# Patient Record
Sex: FEMALE | Race: WHITE | Employment: OTHER | URBAN - METROPOLITAN AREA
[De-identification: names, ages, dates, MRNs, and addresses within clinical notes are randomized per-mention and may not be internally consistent; named-entity substitution may affect disease eponyms.]

---

## 2017-11-13 ENCOUNTER — HOSPITAL ENCOUNTER (OUTPATIENT)
Age: 72
Setting detail: OUTPATIENT SURGERY
Discharge: HOME | End: 2017-11-13
Attending: SPECIALIST | Admitting: SPECIALIST
Payer: MEDICARE

## 2017-11-13 ENCOUNTER — ANESTHESIA EVENT (OUTPATIENT)
Dept: ENDOSCOPY | Age: 72
End: 2017-11-13
Payer: MEDICARE

## 2017-11-13 ENCOUNTER — ANESTHESIA (OUTPATIENT)
Dept: ENDOSCOPY | Age: 72
End: 2017-11-13
Payer: MEDICARE

## 2017-11-13 VITALS
SYSTOLIC BLOOD PRESSURE: 134 MMHG | RESPIRATION RATE: 18 BRPM | BODY MASS INDEX: 27.86 KG/M2 | OXYGEN SATURATION: 97 % | WEIGHT: 167.19 LBS | TEMPERATURE: 97.8 F | HEART RATE: 67 BPM | HEIGHT: 65 IN | DIASTOLIC BLOOD PRESSURE: 86 MMHG

## 2017-11-13 LAB
H PYLORI FROM TISSUE: NEGATIVE
KIT LOT NO., HCLOLOT: NORMAL
NEGATIVE CONTROL: NEGATIVE
POSITIVE CONTROL: POSITIVE

## 2017-11-13 PROCEDURE — 87077 CULTURE AEROBIC IDENTIFY: CPT | Performed by: SPECIALIST

## 2017-11-13 PROCEDURE — 88305 TISSUE EXAM BY PATHOLOGIST: CPT | Performed by: SPECIALIST

## 2017-11-13 PROCEDURE — 76060000031 HC ANESTHESIA FIRST 0.5 HR: Performed by: SPECIALIST

## 2017-11-13 PROCEDURE — 76040000019: Performed by: SPECIALIST

## 2017-11-13 PROCEDURE — 77030009426 HC FCPS BIOP ENDOSC BSC -B: Performed by: SPECIALIST

## 2017-11-13 PROCEDURE — 74011250636 HC RX REV CODE- 250/636

## 2017-11-13 RX ORDER — FLUMAZENIL 0.1 MG/ML
0.2 INJECTION INTRAVENOUS
Status: DISCONTINUED | OUTPATIENT
Start: 2017-11-13 | End: 2017-11-13 | Stop reason: HOSPADM

## 2017-11-13 RX ORDER — SODIUM CHLORIDE 9 MG/ML
50 INJECTION, SOLUTION INTRAVENOUS CONTINUOUS
Status: DISCONTINUED | OUTPATIENT
Start: 2017-11-13 | End: 2017-11-13 | Stop reason: HOSPADM

## 2017-11-13 RX ORDER — LORAZEPAM 2 MG/ML
2 INJECTION INTRAMUSCULAR AS NEEDED
Status: DISCONTINUED | OUTPATIENT
Start: 2017-11-13 | End: 2017-11-13 | Stop reason: HOSPADM

## 2017-11-13 RX ORDER — PROPOFOL 10 MG/ML
INJECTION, EMULSION INTRAVENOUS AS NEEDED
Status: DISCONTINUED | OUTPATIENT
Start: 2017-11-13 | End: 2017-11-13 | Stop reason: HOSPADM

## 2017-11-13 RX ORDER — RANITIDINE 150 MG/1
150 TABLET, FILM COATED ORAL DAILY
COMMUNITY
End: 2020-02-28 | Stop reason: ALTCHOICE

## 2017-11-13 RX ORDER — GLUCOSAMINE SULFATE 1500 MG
1000 POWDER IN PACKET (EA) ORAL DAILY
COMMUNITY

## 2017-11-13 RX ORDER — ASPIRIN 81 MG/1
81 TABLET ORAL DAILY
Status: ON HOLD | COMMUNITY
End: 2020-01-30

## 2017-11-13 RX ORDER — EPINEPHRINE 0.1 MG/ML
1 INJECTION INTRACARDIAC; INTRAVENOUS
Status: DISCONTINUED | OUTPATIENT
Start: 2017-11-13 | End: 2017-11-13 | Stop reason: HOSPADM

## 2017-11-13 RX ORDER — NALOXONE HYDROCHLORIDE 0.4 MG/ML
0.4 INJECTION, SOLUTION INTRAMUSCULAR; INTRAVENOUS; SUBCUTANEOUS
Status: DISCONTINUED | OUTPATIENT
Start: 2017-11-13 | End: 2017-11-13 | Stop reason: HOSPADM

## 2017-11-13 RX ORDER — SODIUM CHLORIDE 9 MG/ML
INJECTION, SOLUTION INTRAVENOUS
Status: DISCONTINUED | OUTPATIENT
Start: 2017-11-13 | End: 2017-11-13 | Stop reason: HOSPADM

## 2017-11-13 RX ORDER — SODIUM CHLORIDE 0.9 % (FLUSH) 0.9 %
5-10 SYRINGE (ML) INJECTION EVERY 8 HOURS
Status: DISCONTINUED | OUTPATIENT
Start: 2017-11-13 | End: 2017-11-13 | Stop reason: HOSPADM

## 2017-11-13 RX ORDER — DEXTROMETHORPHAN/PSEUDOEPHED 2.5-7.5/.8
1.2 DROPS ORAL
Status: DISCONTINUED | OUTPATIENT
Start: 2017-11-13 | End: 2017-11-13 | Stop reason: HOSPADM

## 2017-11-13 RX ORDER — FENTANYL CITRATE 50 UG/ML
200 INJECTION, SOLUTION INTRAMUSCULAR; INTRAVENOUS
Status: DISCONTINUED | OUTPATIENT
Start: 2017-11-13 | End: 2017-11-13 | Stop reason: HOSPADM

## 2017-11-13 RX ORDER — SODIUM CHLORIDE 0.9 % (FLUSH) 0.9 %
5-10 SYRINGE (ML) INJECTION AS NEEDED
Status: DISCONTINUED | OUTPATIENT
Start: 2017-11-13 | End: 2017-11-13 | Stop reason: HOSPADM

## 2017-11-13 RX ORDER — MIDAZOLAM HYDROCHLORIDE 1 MG/ML
.25-1 INJECTION, SOLUTION INTRAMUSCULAR; INTRAVENOUS
Status: DISCONTINUED | OUTPATIENT
Start: 2017-11-13 | End: 2017-11-13 | Stop reason: HOSPADM

## 2017-11-13 RX ORDER — ATORVASTATIN CALCIUM 20 MG/1
10 TABLET, FILM COATED ORAL DAILY
COMMUNITY
End: 2022-05-26 | Stop reason: SDUPTHER

## 2017-11-13 RX ORDER — ATROPINE SULFATE 0.1 MG/ML
0.5 INJECTION INTRAVENOUS
Status: DISCONTINUED | OUTPATIENT
Start: 2017-11-13 | End: 2017-11-13 | Stop reason: HOSPADM

## 2017-11-13 RX ADMIN — PROPOFOL 20 MG: 10 INJECTION, EMULSION INTRAVENOUS at 10:18

## 2017-11-13 RX ADMIN — PROPOFOL 30 MG: 10 INJECTION, EMULSION INTRAVENOUS at 10:15

## 2017-11-13 RX ADMIN — PROPOFOL 70 MG: 10 INJECTION, EMULSION INTRAVENOUS at 10:14

## 2017-11-13 RX ADMIN — PROPOFOL 20 MG: 10 INJECTION, EMULSION INTRAVENOUS at 10:24

## 2017-11-13 RX ADMIN — PROPOFOL 30 MG: 10 INJECTION, EMULSION INTRAVENOUS at 10:26

## 2017-11-13 RX ADMIN — PROPOFOL 10 MG: 10 INJECTION, EMULSION INTRAVENOUS at 10:16

## 2017-11-13 RX ADMIN — PROPOFOL 20 MG: 10 INJECTION, EMULSION INTRAVENOUS at 10:20

## 2017-11-13 RX ADMIN — PROPOFOL 30 MG: 10 INJECTION, EMULSION INTRAVENOUS at 10:22

## 2017-11-13 RX ADMIN — SODIUM CHLORIDE: 9 INJECTION, SOLUTION INTRAVENOUS at 10:01

## 2017-11-13 RX ADMIN — PROPOFOL 20 MG: 10 INJECTION, EMULSION INTRAVENOUS at 10:17

## 2017-11-13 NOTE — ROUTINE PROCESS
Sita Ewing  [birth year]  [id number]    Situation:  Verbal report received from: Mickey Roman RN  Procedure: Procedure(s):  ESOPHAGOGASTRODUODENOSCOPY (EGD)  ESOPHAGOGASTRODUODENAL (EGD) BIOPSY  ESOPHAGEAL DILATION    Background:    Preoperative diagnosis: DYSPHAGIA, GERD  Postoperative diagnosis: Gastritis  Fundal Gland Polyps  Small Hiatal Hernia  Esophageal Stricture, questionable Shatzki's Ring    :  Dr. Yovani Armenta  Assistant(s): Endoscopy Technician-1: Latha Alvarez  Endoscopy RN-1: Alison Gonzalez RN    Specimens:   ID Type Source Tests Collected by Time Destination   1 : Fundal Gland Polyps Preservative Gastric  Balwinder Kent MD 11/13/2017 1019 Pathology   2 : GE [place]unction Preservative Esophagus, Distal  Balwinder Kent MD 11/13/2017 1022 Pathology     H. Pylori  yes    Assessment:  Intra-procedure medications   Anesthesia gave intra-procedure sedation and medications, see anesthesia flow sheet yes    Intravenous fluids: NS@ K[facility]     Vital signs stable     Abdominal assessment: round and soft     Recommendation:  Discharge patient per MD order.   Family  Permission to share finding with family or friend yes

## 2017-11-13 NOTE — DISCHARGE INSTRUCTIONS
James Thompson  [id number]  1945    EGD DISCHARGE INSTRUCTIONS  Discomfort:  Sore throat- throat lozenges or warm salt water gargle  redness at IV site- apply warm compress to area; if redness or soreness persist- contact your physician  Gaseous discomfort- walking, belching will help relieve any discomfort  You may not operate a vehicle for 12 hours  You may not engage in an occupation involving machinery or appliances for rest of today. You may not drink alcoholic beverages for at least 12 hours  Avoid making any critical decisions for at least 24 hour  DIET  You may resume your regular diet - however -  remember your colon is empty and a heavy meal will produce gas. Avoid these foods:  vegetables, fried / greasy foods, carbonated drinks  ACTIVITY  You may resume your normal daily activities. Spend the remainder of the day resting -  avoid any strenuous activity. CALL M.D. ANY SIGN OF   Increasing pain, nausea, vomiting  Abdominal distension (swelling)  New increased bleeding (oral or rectal)  Fever (chills)  Pain in chest area  Bloody discharge from nose or mouth  Shortness of breath    You may take any Advil, Aspirin, Ibuprofen, Motrin, Aleve, or Goodys only if MD recommended, ONLY  Tylenol as needed for pain.     Follow-up Instructions:   Call Dr. Mary Chen office to make appointment for ongoing symptoms  Office telephone for problems or questions [phone]  Results of procedure / biopsy in 10-14 days   Stomach polyps biopsied ordinarily this is not a concern  Small hiatal hernia and minimal inflammation in the esophagus which was stretched

## 2017-11-13 NOTE — IP AVS SNAPSHOT
[address] 
[phone] Patient: Melany Orta MRN: [id number] About your hospitalization You were admitted on:  November 13, 2017 [facility] You were discharged on:  November 13, 2017 Why you were hospitalized Your primary diagnosis was:  Not on File Discharge Orders None A check lm indicates which time of day the medication should be taken. My Medications TAKE these medications as instructed Instructions Each Dose to Equal  
 Morning Noon Evening Bedtime  
 aspirin delayed-release 81 mg tablet Your last dose was: Your next dose is: Take  by mouth daily. atorvastatin 10 mg tablet Commonly known as:  LIPITOR Your last dose was: Your next dose is: Take  by mouth daily. cholecalciferol 1,000 unit Cap Commonly known as:  VITAMIN D3 Your last dose was: Your next dose is: Take  by mouth daily. ZANTAC 150 mg tablet Generic drug:  raNITIdine Your last dose was: Your next dose is: Take 150 mg by mouth two (2) times a day. 150 mg Discharge Instructions Melany Orta [id number] 1945 EGD DISCHARGE INSTRUCTIONS Discomfort: 
Sore throat- throat lozenges or warm salt water gargle 
redness at IV site- apply warm compress to area; if redness or soreness persist- contact your physician Gaseous discomfort- walking, belching will help relieve any discomfort You may not operate a vehicle for 12 hours You may not engage in an occupation involving machinery or appliances for rest of today. You may not drink alcoholic beverages for at least 12 hours Avoid making any critical decisions for at least 24 hour DIET 
You may resume your regular diet  however -  remember your colon is empty and a heavy meal will produce gas. Avoid these foods:  vegetables, fried / greasy foods, carbonated drinks ACTIVITY You may resume your normal daily activities. Spend the remainder of the day resting -  avoid any strenuous activity. CALL M.D. ANY SIGN OF Increasing pain, nausea, vomiting Abdominal distension (swelling) New increased bleeding (oral or rectal) Fever (chills) Pain in chest area Bloody discharge from nose or mouth Shortness of breath You may take any Advil, Aspirin, Ibuprofen, Motrin, Aleve, or Goodys only if MD recommended, ONLY  Tylenol as needed for pain. Follow-up Instructions: 
 Call Dr. Mary Chen office to make appointment for ongoing symptoms Office telephone for problems or questions [phone] Results of procedure / biopsy in 10-14 days Stomach polyps biopsied ordinarily this is not a concern Small hiatal hernia and minimal inflammation in the esophagus which was stretched Introducing [facility]! Tyler Chew introduces [employer] patient portal. Now you can access parts of your medical record, email your doctor's office, and request medication refills online. 1. In your internet browser, go to [email or web address] 2. Click on the First Time User? Click Here link in the Sign In box. You will see the New Member Sign Up page. 3. Enter your [employer] Access Code exactly as it appears below. You will not need to use this code after youve completed the sign-up process. If you do not sign up before the expiration date, you must request a new code. · [employer] Access Code: [email or web address] Expires: 2/11/2018 10:36 AM 
 
4. Enter the last four digits of your Social Security Number (xxxx) and Date of Birth (mm/dd/yyyy) as indicated and click Submit. You will be taken to the next sign-up page. 5. Create a [employer] ID. This will be your [employer] login ID and cannot be changed, so think of one that is secure and easy to remember. 6. Create a [employer] password. You can change your password at any time. 7. Enter your Password Reset Question and Answer. This can be used at a later time if you forget your password. 8. Enter your e-mail address. You will receive e-mail notification when new information is available in [address] 9. Click Sign Up. You can now view and download portions of your medical record. 10. Click the Download Summary menu link to download a portable copy of your medical information. If you have questions, please visit the Frequently Asked Questions section of the [employer] website. Remember, [employer] is NOT to be used for urgent needs. For medical emergencies, dial 911. Now available from your iPhone and Android! Providers Seen During Your Hospitalization Provider Specialty Primary office phone Bruno Libman, MD Gastroenterology [phone] Your Primary Care Physician (PCP) Primary Care Physician Office Phone Office Fax aCrolina Lugo [phone] You are allergic to the following Allergen Reactions Bactrim (Sulfamethoprim) Hives Recent Documentation Height Weight BMI OB Status Smoking Status 1.651 m 75.8 kg 27.82 kg/m2 Postmenopausal Never Smoker Emergency Contacts Name Discharge Info Relation Home Work Mobile [address] CAREGIVER [3] Spouse [3] 21 [phone] Patient Belongings The following personal items are in your possession at time of discharge: 
  Dental Appliances: None  Visual Aid: Glasses Please provide this summary of care documentation to your next provider. Signatures-by signing, you are acknowledging that this After Visit Summary has been reviewed with you and you have received a copy. Patient Signature:  ____________________________________________________________ Date:  ____________________________________________________________  
  
Suzon Mems Provider Signature:  ____________________________________________________________ Date:  ____________________________________________________________

## 2017-11-13 NOTE — PROCEDURES
EGD Procedure Note    Indications:  Dyphagia/odynophagia   Referring Physician: Fe Bess MD   Anesthesia/Sedation:MAC  Endoscopist:  Dr. Antonietta To  Assistant:  Endoscopy Technician-1: Gaby Kenyon  Endoscopy RN-1: Toya Esparza RN    Preoperative diagnosis: DYSPHAGIA, GERD    Postoperative diagnosis: Gastritis  Fundal Gland Polyps  Small Hiatal Hernia  Esophageal Stricture, questionable Shatzki's Ring      Procedure in Detail:  Informed consent was obtained for the procedure, including sedation. Risks of perforation, hemorrhage, adverse drug reaction, and aspiration were discussed. The patient was placed in the left lateral decubitus position. Based on the pre-procedure assessment, including review of the patient's medical history, medications, allergies, and review of systems, she had been deemed to be an appropriate candidate for moderate sedation; she was therefore sedated with the medications listed above. The patient was monitored continuously with ECG tracing, pulse oximetry, blood pressure monitoring, and direct observations. An Olympus video endoscope was inserted into the mouth and advanced under direct vision to into the esophagus, then stomach and duodenum. A careful inspection was made as the gastroscope was withdrawn, including a retroflexed view of the proximal stomach; findings and interventions are described below. Findings:   Esophagus:mild subtle distal stricture or ring dilated with OTG 45 Fr Savory and Bx at GEJ area of focal erythema  Stomach: multiple fundic gland polyps many with coffee grounds and mild antritis with coffee grounds thought due to ASA use - Bx of the polyps and Pyloritek of antrum and fundus to r/o H.pylori. 3 cm hiatal hernia with boom at 35 and GEJ at 32  Duodenum/jejunum: normal    Therapies:  See above    Specimens: see above           EBL: None    Complications:   None; patient tolerated the procedure well. Recommendations:  -Await pathology. , -Follow up with me.     Ludy Can MD

## 2017-11-13 NOTE — PERIOP NOTES
Initial RN admission and assessment performed and documented in Endoscopy navigator. Patient evaluated by anesthesia in pre-procedure holding. All procedural vital signs, airway assessment, and level of consciousness information monitored and recorded by anesthesia staff on the anesthesia record. S/p esophageal dilation with 45f Savory, per MD.    No subcutaneous crepitus of the chest or cervical region was noted post dilatation. Report received from CRNA post procedure. Patient transported to recovery area by RN. Endoscope was pre-cleaned at bedside immediately following procedure by KEDAR Goldstein

## 2017-11-13 NOTE — H&P
Pre-endoscopy H and P    The patient was seen and examined. The airway was assessed and documented. The problem list, past medical history, and medications were reviewed. There is no problem list on file for this patient. Social History     Social History    Marital status:      Spouse name: N/A    Number of children: N/A    Years of education: N/A     Occupational History    Not on file. Social History Main Topics    Smoking status: Never Smoker    Smokeless tobacco: Never Used    Alcohol use Yes    Drug use: Not on file    Sexual activity: Not on file     Other Topics Concern    Not on file     Social History Narrative    No narrative on file     History reviewed. No pertinent past medical history. The patient has a family history of na    Prior to Admission Medications   Prescriptions Last Dose Informant Patient Reported? Taking?   aspirin delayed-release 81 mg tablet 11/6/2017  Yes Yes   Sig: Take  by mouth daily. atorvastatin (LIPITOR) 10 mg tablet 11/7/2017  Yes Yes   Sig: Take  by mouth daily. cholecalciferol (VITAMIN D3) 1,000 unit cap 11/9/2017  Yes Yes   Sig: Take  by mouth daily. raNITIdine (ZANTAC) 150 mg tablet 11/12/2017 at Unknown time  Yes Yes   Sig: Take 150 mg by mouth two (2) times a day. Facility-Administered Medications: None         The review of systems is:  negative for shortness of breath or chest pain      The heart, lungs and mental status were satisfactory for the administration of MAC sedation and for the procedure. Mallampati score: See Anesthesia. I discussed with the patient the objectives, risks, consequences and alternatives to the procedure. Plan: Endoscopic procedure with MAC sedation.     Jaziel Boucher MD  11/13/2017  10:14 AM

## 2017-11-13 NOTE — ANESTHESIA POSTPROCEDURE EVALUATION
Post-Anesthesia Evaluation and Assessment    Patient: Fatemeh Murray MRN: [id number]  SSN: [id number]    YOB: 1945  Age: 67 y.o. Sex: female       Cardiovascular Function/Vital Signs  Visit Vitals    /68    Pulse 66    Temp 36.9 °C (98.5 °F)    Resp 12    Ht 5' 5\" (1.651 m)    Wt 75.8 kg (167 lb 3 oz)    SpO2 93%    BMI 27.82 kg/m2       Patient is status post No value filed. anesthesia for Procedure(s):  ESOPHAGOGASTRODUODENOSCOPY (EGD)  ESOPHAGOGASTRODUODENAL (EGD) BIOPSY  ESOPHAGEAL DILATION. Nausea/Vomiting: None    Postoperative hydration reviewed and adequate. Pain:  Pain Scale 1: Numeric (0 - 10) (11/13/17 0908)  Pain Intensity 1: 0 (11/13/17 0908)   Managed    Neurological Status: At baseline    Mental Status and Level of Consciousness: Arousable    Pulmonary Status:   O2 Device: Nasal cannula (11/13/17 1027)   Adequate oxygenation and airway patent    Complications related to anesthesia: None    Post-anesthesia assessment completed.  No concerns    Signed By: Maryanne Whitlock MD     November 13, 2017

## 2017-11-13 NOTE — ANESTHESIA PREPROCEDURE EVALUATION
Anesthetic History   No history of anesthetic complications            Review of Systems / Medical History  Patient summary reviewed, nursing notes reviewed and pertinent labs reviewed    Pulmonary  Within defined limits                 Neuro/Psych   Within defined limits           Cardiovascular                  Exercise tolerance: >4 METS     GI/Hepatic/Renal     GERD          Comments: dysphagia Endo/Other  Within defined limits           Other Findings            Physical Exam    Airway  Mallampati: II  TM Distance: > 6 cm  Neck ROM: normal range of motion   Mouth opening: Normal     Cardiovascular    Rhythm: regular  Rate: normal         Dental  No notable dental hx       Pulmonary  Breath sounds clear to auscultation               Abdominal         Other Findings            Anesthetic Plan    ASA: 2            Induction: Intravenous  Anesthetic plan and risks discussed with: Patient

## 2018-09-07 ENCOUNTER — HOSPITAL ENCOUNTER (OUTPATIENT)
Age: 73
Setting detail: OUTPATIENT SURGERY
Discharge: HOME | End: 2018-09-07
Attending: COLON & RECTAL SURGERY | Admitting: COLON & RECTAL SURGERY
Payer: MEDICARE

## 2018-09-07 ENCOUNTER — ANESTHESIA EVENT (OUTPATIENT)
Dept: ENDOSCOPY | Age: 73
End: 2018-09-07
Payer: MEDICARE

## 2018-09-07 ENCOUNTER — ANESTHESIA (OUTPATIENT)
Dept: ENDOSCOPY | Age: 73
End: 2018-09-07
Payer: MEDICARE

## 2018-09-07 VITALS
SYSTOLIC BLOOD PRESSURE: 122 MMHG | WEIGHT: 165.25 LBS | BODY MASS INDEX: 27.53 KG/M2 | RESPIRATION RATE: 15 BRPM | HEIGHT: 65 IN | TEMPERATURE: 97.7 F | HEART RATE: 64 BPM | DIASTOLIC BLOOD PRESSURE: 69 MMHG | OXYGEN SATURATION: 96 %

## 2018-09-07 PROCEDURE — 74011250636 HC RX REV CODE- 250/636

## 2018-09-07 PROCEDURE — 77030013992 HC SNR POLYP ENDOSC BSC -B: Performed by: COLON & RECTAL SURGERY

## 2018-09-07 PROCEDURE — 77030027957 HC TBNG IRR ENDOGTR BUSS -B: Performed by: COLON & RECTAL SURGERY

## 2018-09-07 PROCEDURE — 74011250637 HC RX REV CODE- 250/637: Performed by: COLON & RECTAL SURGERY

## 2018-09-07 PROCEDURE — 76060000031 HC ANESTHESIA FIRST 0.5 HR: Performed by: COLON & RECTAL SURGERY

## 2018-09-07 PROCEDURE — 76040000019: Performed by: COLON & RECTAL SURGERY

## 2018-09-07 PROCEDURE — 88305 TISSUE EXAM BY PATHOLOGIST: CPT | Performed by: COLON & RECTAL SURGERY

## 2018-09-07 RX ORDER — SODIUM CHLORIDE 9 MG/ML
INJECTION, SOLUTION INTRAVENOUS
Status: DISCONTINUED | OUTPATIENT
Start: 2018-09-07 | End: 2018-09-07 | Stop reason: HOSPADM

## 2018-09-07 RX ORDER — FLUMAZENIL 0.1 MG/ML
0.2 INJECTION INTRAVENOUS
Status: DISCONTINUED | OUTPATIENT
Start: 2018-09-07 | End: 2018-09-07 | Stop reason: HOSPADM

## 2018-09-07 RX ORDER — SODIUM CHLORIDE 0.9 % (FLUSH) 0.9 %
5-10 SYRINGE (ML) INJECTION AS NEEDED
Status: DISCONTINUED | OUTPATIENT
Start: 2018-09-07 | End: 2018-09-07 | Stop reason: HOSPADM

## 2018-09-07 RX ORDER — EPINEPHRINE 0.1 MG/ML
1 INJECTION INTRACARDIAC; INTRAVENOUS
Status: DISCONTINUED | OUTPATIENT
Start: 2018-09-07 | End: 2018-09-07 | Stop reason: HOSPADM

## 2018-09-07 RX ORDER — SODIUM CHLORIDE 0.9 % (FLUSH) 0.9 %
5-10 SYRINGE (ML) INJECTION EVERY 8 HOURS
Status: DISCONTINUED | OUTPATIENT
Start: 2018-09-07 | End: 2018-09-07 | Stop reason: HOSPADM

## 2018-09-07 RX ORDER — SODIUM CHLORIDE 9 MG/ML
50 INJECTION, SOLUTION INTRAVENOUS CONTINUOUS
Status: DISCONTINUED | OUTPATIENT
Start: 2018-09-07 | End: 2018-09-07 | Stop reason: HOSPADM

## 2018-09-07 RX ORDER — PROPOFOL 10 MG/ML
INJECTION, EMULSION INTRAVENOUS AS NEEDED
Status: DISCONTINUED | OUTPATIENT
Start: 2018-09-07 | End: 2018-09-07 | Stop reason: HOSPADM

## 2018-09-07 RX ORDER — DEXTROMETHORPHAN/PSEUDOEPHED 2.5-7.5/.8
1.2 DROPS ORAL
Status: DISCONTINUED | OUTPATIENT
Start: 2018-09-07 | End: 2018-09-07 | Stop reason: HOSPADM

## 2018-09-07 RX ORDER — ATROPINE SULFATE 0.1 MG/ML
0.5 INJECTION INTRAVENOUS
Status: DISCONTINUED | OUTPATIENT
Start: 2018-09-07 | End: 2018-09-07 | Stop reason: HOSPADM

## 2018-09-07 RX ORDER — NALOXONE HYDROCHLORIDE 0.4 MG/ML
0.4 INJECTION, SOLUTION INTRAMUSCULAR; INTRAVENOUS; SUBCUTANEOUS
Status: DISCONTINUED | OUTPATIENT
Start: 2018-09-07 | End: 2018-09-07 | Stop reason: HOSPADM

## 2018-09-07 RX ADMIN — PROPOFOL 100 MG: 10 INJECTION, EMULSION INTRAVENOUS at 11:29

## 2018-09-07 RX ADMIN — PROPOFOL 50 MG: 10 INJECTION, EMULSION INTRAVENOUS at 11:35

## 2018-09-07 RX ADMIN — PROPOFOL 50 MG: 10 INJECTION, EMULSION INTRAVENOUS at 11:32

## 2018-09-07 RX ADMIN — PROPOFOL 25 MG: 10 INJECTION, EMULSION INTRAVENOUS at 11:39

## 2018-09-07 RX ADMIN — SODIUM CHLORIDE: 9 INJECTION, SOLUTION INTRAVENOUS at 11:26

## 2018-09-07 RX ADMIN — SIMETHICONE 80 MG: 20 SUSPENSION/ DROPS ORAL at 11:40

## 2018-09-07 NOTE — BRIEF OP NOTE
BRIEF OPERATIVE NOTE Date of Procedure: 9/7/2018 Preoperative Diagnosis: HISTORY OF POLYPS Postoperative Diagnosis: 1.- Diverticulosis 2.- Hemorrhoids Procedure(s): 
COLONOSCOPY 
ENDOSCOPIC POLYPECTOMY Surgeon(s) and Role: 
   * Pete Gold MD - Primary Surgical Assistant:  
 
Surgical Staff: 
Endoscopy Technician-1: Kera Dickens IV Endoscopy RN-1: Dangelo Pink RN No case tracking events are documented in the log. Anesthesia: MAC Estimated Blood Loss: none Specimens:  
ID Type Source Tests Collected by Time Destination 1 : Ascending Colon Polyp Preservative   Pete Gold MD 9/7/2018 1138 Pathology Findings: ascending colon polyp, severe diverticulosis in the sigmoid colon, hemorrhoids Complications: none apparent Implants: * No implants in log *

## 2018-09-07 NOTE — ANESTHESIA PREPROCEDURE EVALUATION
Anesthetic History No history of anesthetic complications Review of Systems / Medical History Patient summary reviewed, nursing notes reviewed and pertinent labs reviewed Pulmonary Within defined limits Neuro/Psych Within defined limits Cardiovascular Exercise tolerance: >4 METS 
  
GI/Hepatic/Renal 
  
GERD Comments: schatki ring 
gastritis Endo/Other Within defined limits Other Findings Physical Exam 
 
Airway Mallampati: I 
TM Distance: > 6 cm Neck ROM: normal range of motion Mouth opening: Normal 
 
 Cardiovascular Rhythm: regular Rate: normal 
 
 
 
 Dental 
No notable dental hx Pulmonary Breath sounds clear to auscultation Abdominal 
 
 
 
 Other Findings Anesthetic Plan ASA: 2 Anesthesia type: MAC Induction: Intravenous Anesthetic plan and risks discussed with: Patient

## 2018-09-07 NOTE — PROGRESS NOTES

## 2018-09-07 NOTE — DISCHARGE INSTRUCTIONS
Hannah Brower  [id number]  1945    COLON DISCHARGE INSTRUCTIONS  Discomfort:  Redness at IV site- apply warm compress to area; if redness or soreness persist- contact your physician  There may be a slight amount of blood passed from the rectum  Gaseous discomfort- walking, belching will help relieve any discomfort  You may not operate a vehicle for 12 hours  You may not engage in an occupation involving machinery or appliances for rest of today  You may not drink alcoholic beverages for at least 12 hours  Avoid making any critical decisions for at least 24 hour  DIET:   High fiber diet. - however -  remember your colon is empty and a heavy meal will produce gas. Avoid these foods:  vegetables, fried / greasy foods, carbonated drinks for today    MEDICATIONS:  Avoid blood thinners for one week       ACTIVITY:  You may resume your normal daily activities it is recommended that you spend the remainder of the day resting -  avoid any strenuous activity. CALL M.D. ANY SIGN OF:   Increasing pain, nausea, vomiting  Abdominal distension (swelling)  New increased bleeding (oral or rectal)  Fever (chills)  Pain in chest area  Bloody discharge from nose or mouth  Shortness of breath     Follow-up Instructions:   Call Nell Tai MD if any questions or problems. Telephone # [phone]  Biopsy results will be available in  7 to10 days  Should have a repeat colonoscopy in 3 years. COLONOSCOPY FINDINGS:  Your colonoscopy showed: hemorrhoids, severe diverticulosis, one ascending colon polyp. Diverticulosis: Care Instructions  Your Care Instructions  In diverticulosis, pouches called diverticula form in the wall of the large intestine (colon). The pouches do not cause any pain or other symptoms. Most people who have diverticulosis do not know they have it. But the pouches sometimes bleed, and if they become infected, they can cause pain and other symptoms.  When this happens, it is called diverticulitis. Diverticula form when pressure pushes the wall of the colon outward at certain weak points. A diet that is too low in fiber can cause diverticula. Follow-up care is a key part of your treatment and safety. Be sure to make and go to all appointments, and call your doctor if you are having problems. It's also a good idea to know your test results and keep a list of the medicines you take. How can you care for yourself at home? · Include fruits, leafy green vegetables, beans, and whole grains in your diet each day. These foods are high in fiber. · Take a fiber supplement, such as Citrucel or Metamucil, every day if needed. Read and follow all instructions on the label. · Drink plenty of fluids, enough so that your urine is light yellow or clear like water. If you have kidney, heart, or liver disease and have to limit fluids, talk with your doctor before you increase the amount of fluids you drink. · Get at least 30 minutes of exercise on most days of the week. Walking is a good choice. You also may want to do other activities, such as running, swimming, cycling, or playing tennis or team sports. · Cut out foods that cause gas, pain, or other symptoms. When should you call for help? Call your doctor now or seek immediate medical care if:    · You have belly pain.     · You pass maroon or very bloody stools.     · You have a fever.     · You have nausea and vomiting.     · You have unusual changes in your bowel movements or abdominal swelling.     · You have burning pain when you urinate.     · You have abnormal vaginal discharge.     · You have shoulder pain.     · You have cramping pain that does not get better when you have a bowel movement or pass gas.     · You pass gas or stool from your urethra while urinating.    Watch closely for changes in your health, and be sure to contact your doctor if you have any problems. Where can you learn more?   Go to http://ly-girish.info/. Enter F416 in the search box to learn more about \"Diverticulosis: Care Instructions. \"  Current as of: May 12, 2017  Content Version: 11.7  © 1642-7193 [employer] Care instructions adapted under license by [employer] (which disclaims liability or warranty for this information). If you have questions about a medical condition or this instruction, always ask your healthcare professional. [place] [address] any warranty or liability for your use of this information.

## 2018-09-07 NOTE — ROUTINE PROCESS
Erum Phelan 5/30/2075 
[id number] Situation: 
Verbal report received from: Mini Gordon RN Procedure: Procedure(s): 
COLONOSCOPY 
ENDOSCOPIC POLYPECTOMY Background: 
 
Preoperative diagnosis: HISTORY OF POLYPS Postoperative diagnosis: 1.- Diverticulosis 2.- Hemorrhoids :  Dr. Aydee Parra Assistant(s): Endoscopy Technician-1: Kera Dickens IV Endoscopy RN-1: Master Davis RN Specimens:  
ID Type Source Tests Collected by Time Destination 1 : Ascending Colon Polyp Preservative   Sulma Urias MD 9/7/2018 1138 Pathology H. Pylori  no Assessment: 
Intra-procedure medications Anesthesia gave intra-procedure sedation and medications, see anesthesia flow sheet yes Intravenous fluids: NS@ [facility] Vital signs stable Abdominal assessment: round and soft Recommendation: 
Discharge patient per MD order Family or Friend Pt  Permission to share finding with family or friend yes

## 2018-09-07 NOTE — H&P
Colon and Rectal Surgery History and Physical 
 
Subjective:  
  
Gail Galeazzi is a 75 y.o. female who has hx louann There are no active problems to display for this patient. Past Medical History:  
Diagnosis Date  GERD (gastroesophageal reflux disease) Past Surgical History:  
Procedure Laterality Date  HX CHOLECYSTECTOMY  HX GI    
 surgery for rectal fissure  HX GI    
 colonoscopy - colon polyp  HX TONSILLECTOMY Social History Substance Use Topics  Smoking status: Never Smoker  Smokeless tobacco: Never Used  Alcohol use Yes Comment: couple of glasses of wine 1-2 times a week at the most  
  
Family History Problem Relation Age of Onset  Stroke Mother   
  ? may d/t cipro and coumadin combination  Heart Disease Mother  Cancer Father   
  pancreatic  Heart Attack Father  Cancer Maternal Grandmother   
  cervical  
 Cancer Paternal Grandmother   
  brain tumor  Colon Polyps Brother  Colon Cancer Paternal Aunt  Macular Degen Paternal Aunt Prior to Admission medications Medication Sig Start Date End Date Taking? Authorizing Provider  
aspirin delayed-release 81 mg tablet Take 81 mg by mouth daily. Yes Historical Provider  
cholecalciferol (VITAMIN D3) 1,000 unit cap Take 1,000 Units by mouth daily. Yes Historical Provider  
atorvastatin (LIPITOR) 10 mg tablet Take 10 mg by mouth nightly. Yes Historical Provider  
raNITIdine (ZANTAC) 150 mg tablet Take 150 mg by mouth daily. Yes Historical Provider Allergies Allergen Reactions  Bactrim [Sulfamethoprim] Hives Review of Systems: A comprehensive review of systems was negative except for that written in the History of Present Illness. Objective:  
 
Visit Vitals  /71  Pulse 61  Resp 17  Ht 5' 5\" (1.651 m)  Wt 75 kg (165 lb 4 oz)  SpO2 97%  Breastfeeding No  
 BMI 27.5 kg/m2 Physical Exam:  
nad Chest clear Heart reg abd soft Imaging:  images and reports reviewed Lab Review:  No results found for this or any previous visit (from the past 24 hour(s)). Labs and radiology: images and reports reviewed Assessment: Hx louann Plan: 1. I recommend proceeding with colonoscopy. Treatment alternatives were discussed. 2. Discussed aspects of surgical intervention, methods, risks (including by not limited to infection, bleeding, hematoma, and perforation of the intestines or solid organs), and the risks of general anesthetic. The patient understands the risks; any and all questions were answered to the patient's satisfaction. Signed By: Jose Metzger MD   
 September 7, 2018

## 2018-09-07 NOTE — IP AVS SNAPSHOT
[address] 
[phone] Patient: Hector Kong MRN: [id number] QZD:4/48/1825 About your hospitalization You were admitted on:  September 7, 2018 You last received care in the:  [facility] ENDOSCOPY You were discharged on:  September 7, 2018 Why you were hospitalized Your primary diagnosis was:  Not on File Follow-up Information Follow up With Details Comments Contact Info Candelario Tam MD   [address] [facility] [place] [address] [phone] Discharge Orders None A check lm indicates which time of day the medication should be taken. My Medications CONTINUE taking these medications Instructions Each Dose to Equal  
 Morning Noon Evening Bedtime  
 aspirin delayed-release 81 mg tablet Your last dose was: Your next dose is: Take 81 mg by mouth daily. 81 mg  
    
   
   
   
  
 atorvastatin 10 mg tablet Commonly known as:  LIPITOR Your last dose was: Your next dose is: Take 10 mg by mouth nightly. 10 mg  
    
   
   
   
  
 cholecalciferol 1,000 unit Cap Commonly known as:  VITAMIN D3 Your last dose was: Your next dose is: Take 1,000 Units by mouth daily. 1000 Units ZANTAC 150 mg tablet Generic drug:  raNITIdine Your last dose was: Your next dose is: Take 150 mg by mouth daily. 150 mg Discharge Instructions Hector Kong [id number] COLON DISCHARGE INSTRUCTIONS Discomfort: 
Redness at IV site- apply warm compress to area; if redness or soreness persist- contact your physician There may be a slight amount of blood passed from the rectum Gaseous discomfort- walking, belching will help relieve any discomfort You may not operate a vehicle for 12 hours You may not engage in an occupation involving machinery or appliances for rest of today You may not drink alcoholic beverages for at least 12 hours Avoid making any critical decisions for at least 24 hour DIET: 
 High fiber diet.  however -  remember your colon is empty and a heavy meal will produce gas. Avoid these foods:  vegetables, fried / greasy foods, carbonated drinks for today MEDICATIONS: 
Avoid blood thinners for one week ACTIVITY: 
You may resume your normal daily activities it is recommended that you spend the remainder of the day resting -  avoid any strenuous activity. CALL M.D. ANY SIGN OF: Increasing pain, nausea, vomiting Abdominal distension (swelling) New increased bleeding (oral or rectal) Fever (chills) Pain in chest area Bloody discharge from nose or mouth Shortness of breath Follow-up Instructions: 
 Call Renata Gotti MD if any questions or problems. Telephone # [phone] Biopsy results will be available in  7 to10 days Should have a repeat colonoscopy in 3 years. COLONOSCOPY FINDINGS: 
Your colonoscopy showed: hemorrhoids, severe diverticulosis, one ascending colon polyp. Diverticulosis: Care Instructions Your Care Instructions In diverticulosis, pouches called diverticula form in the wall of the large intestine (colon). The pouches do not cause any pain or other symptoms. Most people who have diverticulosis do not know they have it. But the pouches sometimes bleed, and if they become infected, they can cause pain and other symptoms. When this happens, it is called diverticulitis. Diverticula form when pressure pushes the wall of the colon outward at certain weak points. A diet that is too low in fiber can cause diverticula. Follow-up care is a key part of your treatment and safety.  Be sure to make and go to all appointments, and call your doctor if you are having problems. It's also a good idea to know your test results and keep a list of the medicines you take. How can you care for yourself at home? · Include fruits, leafy green vegetables, beans, and whole grains in your diet each day. These foods are high in fiber. · Take a fiber supplement, such as Citrucel or Metamucil, every day if needed. Read and follow all instructions on the label. · Drink plenty of fluids, enough so that your urine is light yellow or clear like water. If you have kidney, heart, or liver disease and have to limit fluids, talk with your doctor before you increase the amount of fluids you drink. · Get at least 30 minutes of exercise on most days of the week. Walking is a good choice. You also may want to do other activities, such as running, swimming, cycling, or playing tennis or team sports. · Cut out foods that cause gas, pain, or other symptoms. When should you call for help? Call your doctor now or seek immediate medical care if: 
  · You have belly pain.  
  · You pass maroon or very bloody stools.  
  · You have a fever.  
  · You have nausea and vomiting.  
  · You have unusual changes in your bowel movements or abdominal swelling.  
  · You have burning pain when you urinate.  
  · You have abnormal vaginal discharge.  
  · You have shoulder pain.  
  · You have cramping pain that does not get better when you have a bowel movement or pass gas.  
  · You pass gas or stool from your urethra while urinating.  
 Watch closely for changes in your health, and be sure to contact your doctor if you have any problems. Where can you learn more? Go to http://ly-girish.info/. Enter O360 in the search box to learn more about \"Diverticulosis: Care Instructions. \" Current as of: May 12, 2017 Content Version: 11.7 © 1842-3856 [employer], [employer].  Care instructions adapted under license by [employer] (which disclaims liability or warranty for this information). If you have questions about a medical condition or this instruction, always ask your healthcare professional. [address] any warranty or liability for your use of this information. Introducing [facility] SERVICES! Miladis Gomez introduces [employer] patient portal. Now you can access parts of your medical record, email your doctor's office, and request medication refills online. 1. In your internet browser, go to [email or web address] 2. Click on the First Time User? Click Here link in the Sign In box. You will see the New Member Sign Up page. 3. Enter your [employer] Access Code exactly as it appears below. You will not need to use this code after youve completed the sign-up process. If you do not sign up before the expiration date, you must request a new code. · [employer] Access Code: [email or web address] Expires: 12/6/2018  9:52 AM 
 
4. Enter the last four digits of your Social Security Number (xxxx) and Date of Birth (mm/dd/yyyy) as indicated and click Submit. You will be taken to the next sign-up page. 5. Create a [employer] ID. This will be your [employer] login ID and cannot be changed, so think of one that is secure and easy to remember. 6. Create a [employer] password. You can change your password at any time. 7. Enter your Password Reset Question and Answer. This can be used at a later time if you forget your password. 8. Enter your e-mail address. You will receive e-mail notification when new information is available in [address] 9. Click Sign Up. You can now view and download portions of your medical record. 10. Click the Download Summary menu link to download a portable copy of your medical information. If you have questions, please visit the Frequently Asked Questions section of the [employer] website. Remember, [employer] is NOT to be used for urgent needs. For medical emergencies, dial 911. Now available from your iPhone and Android! Introducing Heriberto Casanova As a [facility] patient, I wanted to make you aware of our electronic visit tool called Heriberto Casanova. [facility]edy LeanWagon 24/7 allows you to connect within minutes with a medical provider 24 hours a day, seven days a week via a mobile device or tablet or logging into a secure website from your computer. You can access Heriberto Casanova from anywhere in the [place]. A virtual visit might be right for you when you have a simple condition and feel like you just dont want to get out of bed, or cant get away from work for an appointment, when your regular [facility] provider is not available (evenings, weekends or holidays), or when youre out of town and need minor care. Electronic visits cost only $49 and if the Hedy BrightNest/7 provider determines a prescription is needed to treat your condition, one can be electronically transmitted to a nearby pharmacy*. Please take a moment to enroll today if you have not already done so. The enrollment process is free and takes just a few minutes. To enroll, please download the [employer] neal to your tablet or phone, or visit [email or web address] to enroll on your computer. And, as an [address] [place] [address] patient with a Night Up account, the results of your visits will be scanned into your electronic medical record and your primary care provider will be able to view the scanned results. We urge you to continue to see your regular [facility] provider for your ongoing medical care. And while your primary care provider may not be the one available when you seek a Heriberto Casanova virtual visit, the peace of mind you get from getting a real diagnosis real time can be priceless. For more information on Heriberto Casanova, view our Frequently Asked Questions (FAQs) at [email or web address]. Sincerely, 
 
Elias Dee MD 
Chief Medical Officer [facility] Graciela Zuluaga *:  certain medications cannot be prescribed via Heriberto Casanova Providers Seen During Your Hospitalization Provider Specialty Primary office phone Elise Mcintyre MD Colon and Rectal Surgery [phone] Your Primary Care Physician (PCP) Primary Care Physician Office Phone Office Fax Alexandre Cavazos [phone] You are allergic to the following Allergen Reactions Bactrim (Sulfamethoprim) Hives Recent Documentation Height Weight Breastfeeding? BMI OB Status Smoking Status 1.651 m 75 kg No 27.5 kg/m2 Postmenopausal Never Smoker Emergency Contacts Name Discharge Info Relation Home Work Mobile 0[address] Emre Martinez CAREGIVER [3] Spouse [3] 21 [phone] Patient Belongings The following personal items are in your possession at time of discharge: 
  Dental Appliances: None  Visual Aid: None Please provide this summary of care documentation to your next provider. Signatures-by signing, you are acknowledging that this After Visit Summary has been reviewed with you and you have received a copy. Patient Signature:  ____________________________________________________________ Date:  ____________________________________________________________  
  
Naty Gilliam Provider Signature:  ____________________________________________________________ Date:  ____________________________________________________________

## 2018-09-07 NOTE — OP NOTES
Colonoscopy Procedure Note    Indications: Previous adenomatous polyp    Anesthesia/Sedation: MAC anesthesia Propofol    Pre-Procedure Exam:  Airway: clear   Heart: normal S1and S2    Lungs: clear bilateral  Abdomen: soft, nontender, bowel sounds present and normal in all quadrants   Mental Status: awake, alert, and oriented to person, place, and time      Procedure in Detail:  Informed consent was obtained for the procedure, including sedation. Risks of perforation, hemorrhage, adverse drug reaction, and aspiration were discussed. The patient was placed in the left lateral decubitus position. Based on the pre-procedure assessment, including review of the patient's medical history, medications, allergies, and review of systems, she had been deemed to be an appropriate candidate for moderate sedation; she was therefore sedated with the medications listed above. The patient was monitored continuously with ECG tracing, pulse oximetry, blood pressure monitoring, and direct observations. A rectal examination was performed. The [id number] was inserted into the rectum and advanced under direct vision to the cecum, which was identified by the ileocecal valve and appendiceal orifice. The quality of the colonic preparation was excellent. A careful inspection was made as the colonoscope was withdrawn, including a retroflexed view of the rectum; findings and interventions are described below. Appropriate photodocumentation was obtained. Findings:   Rectum:     - Protruding lesions:     -Internal Hemorrhoids  Sigmoid:     - Excavated lesions:     - Diverticulosis  Descending Colon:   Normal  Transverse Colon:   Normal  Ascending Colon:     - Protruding lesions:     -Pedunculated Polyp(s) size 5-7 mm removed by polypectomy (snare cautery)  Cecum:   Normal          Specimens: Specimens were collected and sent to pathology. EBL: None    Complications: None; patient tolerated the procedure well.     Attending Attestation: I performed the procedure. Recommendations:   - Repeat colonoscopy in 3 years.     Signed By: Sandee Romero MD                        September 7, 2018

## 2018-09-10 NOTE — ANESTHESIA POSTPROCEDURE EVALUATION
Post-Anesthesia Evaluation and Assessment Patient: Clance Holter MRN: [id number]  SSN: [id number] YOB: 1945  Age: 75 y.o. Sex: female Cardiovascular Function/Vital Signs Visit Vitals  /69  Pulse 64  Temp 36.5 °C (97.7 °F)  Resp 15  Ht 5' 5\" (1.651 m)  Wt 75 kg (165 lb 4 oz)  SpO2 96%  Breastfeeding No  
 BMI 27.5 kg/m2 Patient is status post MAC anesthesia for Procedure(s): 
COLONOSCOPY 
ENDOSCOPIC POLYPECTOMY. Nausea/Vomiting: None Postoperative hydration reviewed and adequate. Pain: 
Pain Scale 1: Numeric (0 - 10) (09/07/18 1200) Pain Intensity 1: 0 (09/07/18 1200) Managed Neurological Status: At baseline Mental Status and Level of Consciousness: Arousable Pulmonary Status:  
O2 Device: Room air (09/07/18 1200) Adequate oxygenation and airway patent Complications related to anesthesia: None Post-anesthesia assessment completed. No concerns Signed By: Edith Tompkins MD   
 September 10, 2018

## 2019-10-16 ENCOUNTER — HOSPITAL ENCOUNTER (OUTPATIENT)
Dept: GENERAL RADIOLOGY | Age: 74
Discharge: HOME | End: 2019-10-16
Attending: SPECIALIST
Payer: MEDICARE

## 2019-10-16 DIAGNOSIS — K21.9 GASTROESOPHAGEAL REFLUX DISEASE: ICD-10-CM

## 2019-10-16 DIAGNOSIS — R13.10 DYSPHAGIA: ICD-10-CM

## 2019-10-16 PROCEDURE — 92611 MOTION FLUOROSCOPY/SWALLOW: CPT | Performed by: SPEECH-LANGUAGE PATHOLOGIST

## 2019-10-16 PROCEDURE — 74230 X-RAY XM SWLNG FUNCJ C+: CPT

## 2019-10-16 NOTE — PROGRESS NOTES
[address], [facility].    Speech Pathology Modified barium swallow Study  Patient: Sarah Carter (89 y.o. female)  Date: 10/16/2019  Referring Provider:  Dr. Patricia Lewis:   Patient reports difficulty with swallowing, primarily with solids. Feels like it gets stuck. Has more trouble when she drinks bubbly drinks - feels like it comes back up or won't go down. Reports she has stopped drinking seltzer water and has notice improvements since drinking only regular drinks. Has had a dilation in the past and reports it helped by does not want another EGD at this time. Referred for MBS and UGI studies to assess etiology of complaints. OBJECTIVE:   Past Medical History:   Past Medical History:   Diagnosis Date    GERD (gastroesophageal reflux disease)      Past Surgical History:   Procedure Laterality Date    COLONOSCOPY N/A 9/7/2018    COL[facility] performed by Eb [facility] at [facility] ENDOSCOPY    HX CHOLECYSTECTOMY      HX GI      surgery for rectal fissure    HX GI      colonoscopy - colon polyp    HX TONSILLECTOMY       Current Dietary Status:  Regular   Radiologist: Dr. Fuad Estrella Views: Lateral;Fluoro  Patient Position: standing     Trial 1:   Consistency Presented: Thin liquid;Puree; Solid   How Presented: Self-fed/presented;Cup/sip;Cup/gulp;Straw;Successive swallows;Spoon   Consistency Amount: (300cc thin Ba, 1 tbsp Ba paste )   Bolus Acceptance: No impairment   Bolus Formation/Control: No impairment:     Propulsion: No impairment   Oral Residue: None   Initiation of Swallow: No impairment   Timing: No impairment   Penetration: None   Aspiration/Timing: No evidence of aspiration   Pharyngeal Clearance: Vallecular residue; Less than 10%(trace coating - appropriate for age )           Decreased Tongue Base Retraction?: No  Laryngeal Elevation: WFL (within functional limits)  Aspiration/Penetration Score: 1 (No penetration or aspiration-Contrast does not enter the airway)  Pharyngeal Symmetry: Not assessed  Pharyngeal-Esophageal Segment: No impairment  Pharyngeal Dysfunction: None    Oral Phase Severity: No impairment  Pharyngeal Phase Severity: N/A    ASSESSMENT :  Based on the objective data described above, the patient presents with no oral or pharyngeal dysphagia. Timely and complete mastication, timely swallow initiation and no penetration or aspiration. Trace pharyngeal coating after the swallow that is normal for age. Suspect GI origin to complaints     PLAN/RECOMMENDATIONS :  --regular diet. Follow up with GI as indicated for further assessment of possible esophageal dysphagia     COMMUNICATION/EDUCATION:   The above findings and recommendations were discussed with: patient  who verbalized understanding. Thank you for this referral.  Ronal Lockhart M.CD.  CCC-SLP   Time Calculation: 10 mins

## 2019-10-23 ENCOUNTER — HOSPITAL ENCOUNTER (OUTPATIENT)
Dept: GENERAL RADIOLOGY | Age: 74
Discharge: HOME | End: 2019-10-23
Attending: SPECIALIST
Payer: MEDICARE

## 2019-10-23 DIAGNOSIS — R13.10 DYSPHAGIA: ICD-10-CM

## 2019-10-23 DIAGNOSIS — K21.9 GASTROESOPHAGEAL REFLUX DISEASE: ICD-10-CM

## 2019-10-23 PROCEDURE — 74241 XR UPPER GI W KUB/ BA SWALLOW: CPT

## 2019-11-27 LAB
CREATININE, EXTERNAL: 0.69
HBA1C MFR BLD HPLC: 5.7 %
LDL-C, EXTERNAL: 74

## 2020-01-30 ENCOUNTER — ANESTHESIA (OUTPATIENT)
Dept: ENDOSCOPY | Age: 75
End: 2020-01-30
Payer: MEDICARE

## 2020-01-30 ENCOUNTER — HOSPITAL ENCOUNTER (OUTPATIENT)
Age: 75
Setting detail: OUTPATIENT SURGERY
Discharge: HOME | End: 2020-01-30
Attending: SPECIALIST | Admitting: SPECIALIST
Payer: MEDICARE

## 2020-01-30 ENCOUNTER — ANESTHESIA EVENT (OUTPATIENT)
Dept: ENDOSCOPY | Age: 75
End: 2020-01-30
Payer: MEDICARE

## 2020-01-30 VITALS
TEMPERATURE: 98.5 F | HEART RATE: 62 BPM | DIASTOLIC BLOOD PRESSURE: 75 MMHG | WEIGHT: 165.25 LBS | BODY MASS INDEX: 27.53 KG/M2 | HEIGHT: 65 IN | SYSTOLIC BLOOD PRESSURE: 115 MMHG | RESPIRATION RATE: 19 BRPM | OXYGEN SATURATION: 94 %

## 2020-01-30 PROCEDURE — 76060000031 HC ANESTHESIA FIRST 0.5 HR: Performed by: SPECIALIST

## 2020-01-30 PROCEDURE — 77030018712 HC DEV BLLN INFL BSC -B: Performed by: SPECIALIST

## 2020-01-30 PROCEDURE — 76040000019: Performed by: SPECIALIST

## 2020-01-30 PROCEDURE — 77030020268 HC MISC GENERAL SUPPLY: Performed by: SPECIALIST

## 2020-01-30 PROCEDURE — C1726 CATH, BAL DIL, NON-VASCULAR: HCPCS | Performed by: SPECIALIST

## 2020-01-30 PROCEDURE — 74011000250 HC RX REV CODE- 250: Performed by: NURSE ANESTHETIST, CERTIFIED REGISTERED

## 2020-01-30 PROCEDURE — 74011250636 HC RX REV CODE- 250/636: Performed by: NURSE ANESTHETIST, CERTIFIED REGISTERED

## 2020-01-30 PROCEDURE — 88305 TISSUE EXAM BY PATHOLOGIST: CPT

## 2020-01-30 PROCEDURE — 87077 CULTURE AEROBIC IDENTIFY: CPT | Performed by: SPECIALIST

## 2020-01-30 PROCEDURE — 77030021593 HC FCPS BIOP ENDOSC BSC -A: Performed by: SPECIALIST

## 2020-01-30 RX ORDER — SODIUM CHLORIDE 9 MG/ML
INJECTION, SOLUTION INTRAVENOUS
Status: DISCONTINUED | OUTPATIENT
Start: 2020-01-30 | End: 2020-01-30 | Stop reason: HOSPADM

## 2020-01-30 RX ORDER — SODIUM CHLORIDE 0.9 % (FLUSH) 0.9 %
5-40 SYRINGE (ML) INJECTION AS NEEDED
Status: DISCONTINUED | OUTPATIENT
Start: 2020-01-30 | End: 2020-01-30 | Stop reason: HOSPADM

## 2020-01-30 RX ORDER — PROPOFOL 10 MG/ML
INJECTION, EMULSION INTRAVENOUS AS NEEDED
Status: DISCONTINUED | OUTPATIENT
Start: 2020-01-30 | End: 2020-01-30 | Stop reason: HOSPADM

## 2020-01-30 RX ORDER — DOXYCYCLINE 50 MG/1
20 TABLET ORAL 2 TIMES DAILY
COMMUNITY
End: 2020-02-28 | Stop reason: ALTCHOICE

## 2020-01-30 RX ORDER — DEXTROMETHORPHAN/PSEUDOEPHED 2.5-7.5/.8
1.2 DROPS ORAL
Status: DISCONTINUED | OUTPATIENT
Start: 2020-01-30 | End: 2020-01-30 | Stop reason: HOSPADM

## 2020-01-30 RX ORDER — LIDOCAINE HYDROCHLORIDE 20 MG/ML
INJECTION, SOLUTION EPIDURAL; INFILTRATION; INTRACAUDAL; PERINEURAL AS NEEDED
Status: DISCONTINUED | OUTPATIENT
Start: 2020-01-30 | End: 2020-01-30 | Stop reason: HOSPADM

## 2020-01-30 RX ORDER — NALOXONE HYDROCHLORIDE 0.4 MG/ML
0.4 INJECTION, SOLUTION INTRAMUSCULAR; INTRAVENOUS; SUBCUTANEOUS
Status: DISCONTINUED | OUTPATIENT
Start: 2020-01-30 | End: 2020-01-30 | Stop reason: HOSPADM

## 2020-01-30 RX ORDER — FENTANYL CITRATE 50 UG/ML
12.5-5 INJECTION, SOLUTION INTRAMUSCULAR; INTRAVENOUS
Status: DISCONTINUED | OUTPATIENT
Start: 2020-01-30 | End: 2020-01-30 | Stop reason: HOSPADM

## 2020-01-30 RX ORDER — FLUMAZENIL 0.1 MG/ML
0.2 INJECTION INTRAVENOUS
Status: DISCONTINUED | OUTPATIENT
Start: 2020-01-30 | End: 2020-01-30 | Stop reason: HOSPADM

## 2020-01-30 RX ORDER — MIDAZOLAM HYDROCHLORIDE 1 MG/ML
.25-1 INJECTION, SOLUTION INTRAMUSCULAR; INTRAVENOUS
Status: DISCONTINUED | OUTPATIENT
Start: 2020-01-30 | End: 2020-01-30 | Stop reason: HOSPADM

## 2020-01-30 RX ORDER — ATROPINE SULFATE 0.1 MG/ML
0.5 INJECTION INTRAVENOUS
Status: DISCONTINUED | OUTPATIENT
Start: 2020-01-30 | End: 2020-01-30 | Stop reason: HOSPADM

## 2020-01-30 RX ORDER — EPINEPHRINE 0.1 MG/ML
1 INJECTION INTRACARDIAC; INTRAVENOUS
Status: DISCONTINUED | OUTPATIENT
Start: 2020-01-30 | End: 2020-01-30 | Stop reason: HOSPADM

## 2020-01-30 RX ORDER — SODIUM CHLORIDE 9 MG/ML
50 INJECTION, SOLUTION INTRAVENOUS CONTINUOUS
Status: DISCONTINUED | OUTPATIENT
Start: 2020-01-30 | End: 2020-01-30 | Stop reason: HOSPADM

## 2020-01-30 RX ORDER — SODIUM CHLORIDE 0.9 % (FLUSH) 0.9 %
5-40 SYRINGE (ML) INJECTION EVERY 8 HOURS
Status: DISCONTINUED | OUTPATIENT
Start: 2020-01-30 | End: 2020-01-30 | Stop reason: HOSPADM

## 2020-01-30 RX ORDER — FAMOTIDINE 40 MG/1
40 TABLET, FILM COATED ORAL DAILY
COMMUNITY
End: 2021-09-01 | Stop reason: ALTCHOICE

## 2020-01-30 RX ORDER — LORAZEPAM 2 MG/ML
2 INJECTION INTRAMUSCULAR AS NEEDED
Status: DISCONTINUED | OUTPATIENT
Start: 2020-01-30 | End: 2020-01-30 | Stop reason: HOSPADM

## 2020-01-30 RX ADMIN — PROPOFOL 50 MG: 10 INJECTION, EMULSION INTRAVENOUS at 08:20

## 2020-01-30 RX ADMIN — PROPOFOL 100 MG: 10 INJECTION, EMULSION INTRAVENOUS at 08:01

## 2020-01-30 RX ADMIN — PROPOFOL 50 MG: 10 INJECTION, EMULSION INTRAVENOUS at 08:05

## 2020-01-30 RX ADMIN — PROPOFOL 50 MG: 10 INJECTION, EMULSION INTRAVENOUS at 08:24

## 2020-01-30 RX ADMIN — PROPOFOL 30 MG: 10 INJECTION, EMULSION INTRAVENOUS at 08:14

## 2020-01-30 RX ADMIN — SODIUM CHLORIDE: 900 INJECTION, SOLUTION INTRAVENOUS at 07:44

## 2020-01-30 RX ADMIN — PROPOFOL 40 MG: 10 INJECTION, EMULSION INTRAVENOUS at 08:17

## 2020-01-30 RX ADMIN — PROPOFOL 20 MG: 10 INJECTION, EMULSION INTRAVENOUS at 08:12

## 2020-01-30 RX ADMIN — LIDOCAINE HYDROCHLORIDE 40 MG: 20 INJECTION, SOLUTION EPIDURAL; INFILTRATION; INTRACAUDAL; PERINEURAL at 08:01

## 2020-01-30 RX ADMIN — PROPOFOL 30 MG: 10 INJECTION, EMULSION INTRAVENOUS at 08:09

## 2020-01-30 NOTE — PERIOP NOTES
CRE balloon dilatation of the esophagus  13.5 mm Balloon inflated to 4.5 ATMs and held for 60 seconds. 15 mm Balloon inflated to 8 ATMs and held for 60 seconds. No subcutaneous crepitus of the chest or cervical region was noted post dilatation.

## 2020-01-30 NOTE — PERIOP NOTES
CRE balloon dilatation of the esophagus   16.5 mm Balloon inflated to 4.5 ATMs and held for 60 seconds. slight bleeding  16.5 mm Balloon inflated to 4.5 ATMs and held for 60 seconds. 18 mm Balloon inflated to 7 ATMs and held for 60 seconds. No subcutaneous crepitus of the chest or cervical region was noted post dilatation.

## 2020-01-30 NOTE — ROUTINE PROCESS
Leo Ceron  [birth year]  [id number]    Situation:  Verbal report received from: Alena Colin RN  Procedure: Procedure(s):  ESOPHAGOGASTRODUODENOSCOPY (EGD)  ESOPHAGOGASTRODUODENAL (EGD) BIOPSY  ESOPHAGEAL DILATION    Background:    Preoperative diagnosis: GASTROESOPHAGEAL REFLUX DISEASE  DYSPHAGIA(RECURRENT)  STRICTURE OF ESOPHAGUS  Postoperative diagnosis: Schatzki's Ring  Fundic gland polyps    :  Dr. Ewelina Goldsmith  Assistant(s): Endoscopy Technician-1: Maria G Lopez  Endoscopy RN-1: Ismael Velasquez RN    Specimens:   ID Type Source Tests Collected by Time Destination   1 : antrum Preservative Gastric  Jessenia Kelly MD 1/30/2020 2764 Pathology   2 : Body Preservative Gastric  Jessenia Kelly MD 1/30/2020 3578 Pathology   3 : Fundic gland gastric body polyp Preservative Gastric  Jessenia Kelly MD 1/30/2020 0371 Pathology     H. Pylori  yes    Assessment:  Intra-procedure medications       Anesthesia gave intra-procedure sedation and medications, see anesthesia flow sheet yes    Intravenous fluids: NS@ KVO     Vital signs stable     Abdominal assessment: round and soft     Recommendation:  Discharge patient per MD order.   Family  Permission to share finding with family or friend yes

## 2020-01-30 NOTE — PROCEDURES
EGD Procedure Note    Indications:  Dysphagia/odynophagia, GERD, flare of sx's after dc of PPI, better with famotadine, UGI distal stricture of esophagus, Hx of esophageal ring   Referring Physician: Kermit Pierce MD   Anesthesia/Sedation:MAC  Endoscopist:  Dr. Manisha Michel  Assistant:  Endoscopy Technician-1: Burke RAHMAN  Endoscopy RN-1: Bambi Gonzalez RN  Surgical Assistant: None  Implants: None      Preoperative diagnosis: GASTROESOPHAGEAL REFLUX DISEASE  DYSPHAGIA(RECURRENT)  STRICTURE OF ESOPHAGUS    Postoperative diagnosis: Schatzki's Ring  Fundic gland polyps      Procedure in Detail:  Informed consent was obtained for the procedure, including sedation. Risks of perforation, hemorrhage, adverse drug reaction, and aspiration were discussed. The patient was placed in the left lateral decubitus position. Based on the pre-procedure assessment, including review of the patient's medical history, medications, allergies, and review of systems, she had been deemed to be an appropriate candidate for moderate sedation; she was therefore sedated with the medications listed above. The patient was monitored continuously with ECG tracing, pulse oximetry, blood pressure monitoring, and direct observations. An Olympus video endoscope was inserted into the mouth and advanced under direct vision to into the esophagus, then stomach and duodenum. A careful inspection was made as the gastroscope was withdrawn, including a retroflexed view of the proximal stomach; findings and interventions are described below.       Findings:   Esophagus:Schatzki's ring dilated 12-13.5-15-16.5-18 mm TTS Balloon, some blood at 18 mm; OTG [address] for questionable upper esophageal dysphagia  Stomach: small sliding hiatal hernia, multiple fundic gland polyps hemorrhagic 3 mm in distal body Bx; patchy erythema and erosion in antrum - Pyloritek and Bx for path with staining  Duodenum/jejunum: normal    Therapies:  See above     Specimens: see above           EBL: None    Complications:   None; patient tolerated the procedure well. Recommendations:  -Continue acid suppression. , -Await pathology. , -Await SILAS test result and treat for Helicobacter pylori if positive. , -Follow up with me., -No NSAIDS unless recommended by MD Melany Mckeon MD

## 2020-01-30 NOTE — H&P
Pre-endoscopy H and P    The patient was seen and examined. The airway was assessed and documented. The problem list, past medical history, and medications were reviewed. There is no problem list on file for this patient. Social History     Socioeconomic History    Marital status:      Spouse name: Not on file    Number of children: Not on file    Years of education: Not on file    Highest education level: Not on file   Occupational History    Not on file   Social Needs    Financial resource strain: Not on file    Food insecurity:     Worry: Not on file     Inability: Not on file    Transportation needs:     Medical: Not on file     Non-medical: Not on file   Tobacco Use    Smoking status: Never Smoker    Smokeless tobacco: Never Used   Substance and Sexual Activity    Alcohol use: Yes     Comment: couple of glasses of wine 1-2 times a week at the most    Drug use: Not on file    Sexual activity: Not on file   Lifestyle    Physical activity:     Days per week: Not on file     Minutes per session: Not on file    Stress: Not on file   Relationships    Social connections:     Talks on phone: Not on file     Gets together: Not on file     Attends Ep[employer]opalian service: Not on file     Active member of club or organization: Not on file     Attends meetings of clubs or organizations: Not on file     Relationship status: Not on file    Intimate partner violence:     Fear of current or ex partner: Not on file     Emotionally abused: Not on file     Physically abused: Not on file     Forced sexual activity: Not on file   Other Topics Concern    Not on file   Social History Narrative    Not on file     Past Medical History:   Diagnosis Date    GERD (gastroesophageal reflux disease)      The patient has a family history of nanegative    Prior to Admission Medications   Prescriptions Last Dose Informant Patient Reported?  Taking?   aspirin delayed-release 81 mg tablet   Yes No   Sig: Take 81 mg by mouth daily. atorvastatin (LIPITOR) 10 mg tablet   Yes No   Sig: Take 10 mg by mouth nightly. cholecalciferol (VITAMIN D3) 1,000 unit cap   Yes No   Sig: Take 1,000 Units by mouth daily. raNITIdine (ZANTAC) 150 mg tablet   Yes No   Sig: Take 150 mg by mouth daily. Facility-Administered Medications: None         The review of systems is:  Negative for shortness of breath or chest pain      The heart, lungs and mental status were satisfactory for the administration of MAC sedation and for the procedure. Mallampati score: See Anesthesia. I discussed with the patient the objectives, risks, consequences and alternatives to the procedure. Plan: Endoscopic procedure with MAC sedation.     Daniella Elias MD  1/30/2020  6:49 AM

## 2020-01-30 NOTE — DISCHARGE INSTRUCTIONS
Shaq Kowalski  [id number]  1945    EGD DISCHARGE INSTRUCTIONS  Discomfort:  Sore throat- throat lozenges or warm salt water gargle  redness at IV site- apply warm compress to area; if redness or soreness persist- contact your physician  Gaseous discomfort- walking, belching will help relieve any discomfort  You may not operate a vehicle for 12 hours  You may not engage in an occupation involving machinery or appliances for rest of today. You may not drink alcoholic beverages for at least 12 hours  Avoid making any critical decisions for at least 24 hour  DIET  You may resume your regular diet - however -  remember your colon is empty and a heavy meal will produce gas. Avoid these foods:  vegetables, fried / greasy foods, carbonated drinks  ACTIVITY  You may resume your normal daily activities. Spend the remainder of the day resting -  avoid any strenuous activity. CALL M.D. ANY SIGN OF   Increasing pain, nausea, vomiting  Abdominal distension (swelling)  New increased bleeding (oral or rectal)  Fever (chills)  Pain in chest area  Bloody discharge from nose or mouth  Shortness of breath    You {Actions; may/not:10742:s} not take any Advil, Aspirin, Ibuprofen, Motrin, Aleve, or Goodys for 10 days, ONLY  Tylenol as needed for pain. Follow-up Instructions:   Call Dr. Logan Chamber office to make appointment  Office telephone for problems or questions [phone]  Results of procedure / biopsy in 10-14 days   -Continue acid suppression. , -Await pathology. , -Await SILAS test result and treat for Helicobacter pylori if positive. , -Follow up with me., -No NSAIDS unless recommended by MD

## 2020-01-30 NOTE — PERIOP NOTES

## 2020-01-30 NOTE — ANESTHESIA POSTPROCEDURE EVALUATION
Post-Anesthesia Evaluation and Assessment    Patient: Dioni Sun MRN: [id number]  SSN: [id number]    YOB: 1945  Age: 75 y.o. Sex: female      I have evaluated the patient and they are stable and ready for discharge from the PACU. Cardiovascular Function/Vital Signs  Visit Vitals  BP (!) 83/56   Pulse (!) 58   Temp 36.9 °C (98.5 °F)   Resp 9   Ht 5' 5\" (1.651 m)   Wt 75 kg (165 lb 4 oz)   SpO2 94%   BMI 27.50 kg/m²       Patient is status post MAC anesthesia for Procedure(s):  ESOPHAGOGASTRODUODENOSCOPY (EGD)  ESOPHAGOGASTRODUODENAL (EGD) BIOPSY  ESOPHAGEAL DILATION. Nausea/Vomiting: None    Postoperative hydration reviewed and adequate. Pain:  Pain Scale 1: Visual (01/30/20 2869)  Pain Intensity 1: 0 (01/30/20 4222)   Managed    Neurological Status: At baseline    Mental Status, Level of Consciousness: Alert and  oriented to person, place, and time    Pulmonary Status:   O2 Device: Nasal cannula (01/30/20 6847)   Adequate oxygenation and airway patent    Complications related to anesthesia: None    Post-anesthesia assessment completed.  No concerns    Signed By: Gerri Krabbe, MD     January 30, 2020

## 2020-02-28 ENCOUNTER — OFFICE VISIT (OUTPATIENT)
Dept: INTERNAL MEDICINE CLINIC | Age: 75
End: 2020-02-28

## 2020-02-28 VITALS
OXYGEN SATURATION: 94 % | HEIGHT: 65 IN | HEART RATE: 71 BPM | SYSTOLIC BLOOD PRESSURE: 132 MMHG | TEMPERATURE: 98.5 F | BODY MASS INDEX: 27.99 KG/M2 | RESPIRATION RATE: 14 BRPM | DIASTOLIC BLOOD PRESSURE: 81 MMHG | WEIGHT: 168 LBS

## 2020-02-28 DIAGNOSIS — N32.81 OAB (OVERACTIVE BLADDER): ICD-10-CM

## 2020-02-28 DIAGNOSIS — K21.9 GASTROESOPHAGEAL REFLUX DISEASE, ESOPHAGITIS PRESENCE NOT SPECIFIED: Primary | ICD-10-CM

## 2020-02-28 DIAGNOSIS — E78.2 MIXED HYPERLIPIDEMIA: ICD-10-CM

## 2020-02-28 DIAGNOSIS — R73.01 FASTING HYPERGLYCEMIA: ICD-10-CM

## 2020-02-28 RX ORDER — DOXYCYCLINE HYCLATE 20 MG
40 TABLET ORAL DAILY
COMMUNITY
End: 2022-05-26 | Stop reason: SDUPTHER

## 2020-02-29 NOTE — PROGRESS NOTES
HPI:  Keshia Garcia is a 75y.o. year old female who is here for a new patient appt to me. Has been seeing GYN as well as urology. Seeing dermatology as well. Recent EGD with some gastritis, better on pepcid. Has hyperlipidemia and sees cardiology for that. Last labs were accessed and sugar slightly high with A1C of 5.7%. ROS was otherwise negative. Past Medical History:   Diagnosis Date    GERD (gastroesophageal reflux disease)     Mixed hyperlipidemia 2/28/2020    OAB (overactive bladder) 2/28/2020       Past Surgical History:   Procedure Laterality Date    COLONOSCOPY N/A 9/7/2018    [facility] at [facility]COPY    HX CATARACT REMOVAL Bilateral     HX CHOLECYSTECTOMY      HX GI      surgery for rectal fissure    HX GI      colonoscopy - colon polyp    HX TONSILLECTOMY         Prior to Admission medications    Medication Sig Start Date End Date Taking? Authorizing Provider   doxycycline (PERIOSTAT) 20 mg tablet Take 40 mg by mouth daily. Yes Provider, Historical   diph,pertuss,acel,,tetanus vac,PF, (ADACEL) 2 Lf-(2.5-5-3-5 mcg)-5Lf/0.5 mL syrg vaccine 0.5 mL by IntraMUSCular route once for 1 dose. 2/28/20 2/28/20 Yes Calvin Brewster III, MD   famotidine (PEPCID) 40 mg tablet Take 40 mg by mouth daily. Yes Provider, Historical   mirabegron ER (MYRBETRIQ) 50 mg ER tablet Take 50 mg by mouth daily. Yes Provider, Historical   cholecalciferol (VITAMIN D3) 1,000 unit cap Take 1,000 Units by mouth daily. Yes Provider, Historical   atorvastatin (LIPITOR) 20 mg tablet Take 10 mg by mouth daily. Yes Provider, Historical   doxycycline (ADOXA) 50 mg tablet Take 20 mg by mouth two (2) times a day. 2/28/20  Provider, Historical   raNITIdine (ZANTAC) 150 mg tablet Take 150 mg by mouth daily.   2/28/20  Provider, Historical       Social History     Socioeconomic History    Marital status:      Spouse name: Not on file    Number of children: Not on file    Years of education: Not on file    Highest education level: Not on file   Occupational History    Not on file   Social Needs    Financial resource strain: Not on file    Food insecurity:     Worry: Not on file     Inability: Not on file    Transportation needs:     Medical: Not on file     Non-medical: Not on file   Tobacco Use    Smoking status: Never Smoker    Smokeless tobacco: Never Used   Substance and Sexual Activity    Alcohol use:  Yes     Alcohol/week: 2.0 - 3.0 standard drinks     Types: 2 - 3 Standard drinks or equivalent per week     Comment: couple of glasses of wine 1-2 times a week at the most    Drug use: Not Currently    Sexual activity: Not Currently   Lifestyle    Physical activity:     Days per week: Not on file     Minutes per session: Not on file    Stress: Not on file   Relationships    Social connections:     Talks on phone: Not on file     Gets together: Not on file     Attends Mormonism service: Not on file     Active member of club or organization: Not on file     Attends meetings of clubs or organizations: Not on file     Relationship status: Not on file    Intimate partner violence:     Fear of current or ex partner: Not on file     Emotionally abused: Not on file     Physically abused: Not on file     Forced sexual activity: Not on file   Other Topics Concern    Not on file   Social History Narrative    Not on file          ROS  Per HPI    Visit Vitals  /81   Pulse 71   Temp 98.5 °F (36.9 °C) (Oral)   Resp 14   Ht 5' 5\" (1.651 m)   Wt 168 lb (76.2 kg)   SpO2 94%   BMI 27.96 kg/m²         Physical Exam   Physical Examination: General appearance - alert, well appearing, and in no distress  Ears - bilateral TM's and external ear canals normal  Nose - normal and patent, no erythema, discharge or polyps  Mouth - mucous membranes moist, pharynx normal without lesions  Neck - supple, no significant adenopathy  Lymphatics - no palpable lymphadenopathy, no hepatosplenomegaly  Chest - clear to auscultation, no wheezes, rales or rhonchi, symmetric air entry  Heart - normal rate, regular rhythm, normal S1, S2, no murmurs, rubs, clicks or gallops  Abdomen - soft, nontender, nondistended, no masses or organomegaly  Back exam - full range of motion, no tenderness, palpable spasm or pain on motion  Neurological - alert, oriented, normal speech, no focal findings or movement disorder noted  Musculoskeletal - no joint tenderness, deformity or swelling  Extremities - peripheral pulses normal, no pedal edema, no clubbing or cyanosis      Assessment/Plan:  Diagnoses and all orders for this visit:    1. Gastroesophageal reflux disease, esophagitis presence not specified - stable on pepcid. Continue same. 2. OAB (overactive bladder) - continue meds. Urology follow up. 3. Mixed hyperlipidemia - LDL goal of 100 and met on labs. Labs in 12 months. 4. Fasting hyperglycemia - work on diet and exercise for weight loss. Other orders  -     diph,pertuss,acel,,tetanus vac,PF, (ADACEL) 2 Lf-(2.5-5-3-5 mcg)-5Lf/0.5 mL syrg vaccine; 0.5 mL by IntraMUSCular route once for 1 dose. Follow-up and Dispositions    · Return in about 1 year (around 2/28/2021) for CPE. Advised her to call back or return to office if symptoms worsen/change/persist.  Discussed expected course/resolution/complications of diagnosis in detail with patient. Medication risks/benefits/costs/interactions/alternatives discussed with patient. She was given an after visit summary which includes diagnoses, current medications, & vitals. She expressed understanding with the diagnosis and plan.

## 2020-09-29 ENCOUNTER — OFFICE VISIT (OUTPATIENT)
Dept: URGENT CARE | Age: 75
End: 2020-09-29
Payer: MEDICARE

## 2020-09-29 VITALS — HEART RATE: 69 BPM | TEMPERATURE: 98.3 F | RESPIRATION RATE: 16 BRPM | OXYGEN SATURATION: 95 %

## 2020-09-29 DIAGNOSIS — Z20.822 CLOSE EXPOSURE TO 2019 NOVEL CORONAVIRUS: Primary | ICD-10-CM

## 2020-09-29 PROCEDURE — 1101F PT FALLS ASSESS-DOCD LE1/YR: CPT | Performed by: FAMILY MEDICINE

## 2020-09-29 PROCEDURE — G8432 DEP SCR NOT DOC, RNG: HCPCS | Performed by: FAMILY MEDICINE

## 2020-09-29 PROCEDURE — 1090F PRES/ABSN URINE INCON ASSESS: CPT | Performed by: FAMILY MEDICINE

## 2020-09-29 PROCEDURE — G8419 CALC BMI OUT NRM PARAM NOF/U: HCPCS | Performed by: FAMILY MEDICINE

## 2020-09-29 PROCEDURE — G8427 DOCREV CUR MEDS BY ELIG CLIN: HCPCS | Performed by: FAMILY MEDICINE

## 2020-09-29 PROCEDURE — G8400 PT W/DXA NO RESULTS DOC: HCPCS | Performed by: FAMILY MEDICINE

## 2020-09-29 PROCEDURE — 99203 OFFICE O/P NEW LOW 30 MIN: CPT | Performed by: FAMILY MEDICINE

## 2020-09-29 PROCEDURE — G8536 NO DOC ELDER MAL SCRN: HCPCS | Performed by: FAMILY MEDICINE

## 2020-09-29 RX ORDER — GUAIFENESIN 100 MG/5ML
81 LIQUID (ML) ORAL DAILY
COMMUNITY

## 2020-09-29 NOTE — PROGRESS NOTES
This patient was seen in Flu Clinic at [address] [place] [address] Urgent Care while in their vehicle due to COVID-19 pandemic with PPE and focused examination in order to decrease community viral transmission. The patient/guardian gave verbal consent to treat. Declan Carter is a 67 y.o. female who presents for COVID-19 testing. Was exposed to COVID-19 by friend who tested positive today, last contact 2 days ago. Denies cough, fever, SOB. PMH: GERD, HLD. Non-smoker. The history is provided by the patient.         Past Medical History:   Diagnosis Date    GERD (gastroesophageal reflux disease)     Mixed hyperlipidemia 2/28/2020    OAB (overactive bladder) 2/28/2020        Past Surgical History:   Procedure Laterality Date    COLONOSCOPY N/A 9/7/2018    COL[facility] performed [facility] at [facility] ENDOSCOPY    HX CATARACT REMOVAL Bilateral     HX CHOLECYSTECTOMY      HX GI      surgery for rectal fissure    HX GI      colonoscopy - colon polyp    HX TONSILLECTOMY           Family History   Problem Relation Age of Onset    Stroke Mother         ? may d/t cipro and coumadin combination    Heart Disease Mother     Cancer Father         pancreatic    Heart Attack Father     Cancer Maternal Grandmother         cervical    Cancer Paternal Grandmother         brain tumor    Colon Polyps Brother     Colon Cancer Paternal Aunt     Macular Degen Paternal Aunt         Social History     Socioeconomic History    Marital status:      Spouse name: Not on file    Number of children: Not on file    Years of education: Not on file    Highest education level: Not on file   Occupational History    Not on file   Social Needs    Financial resource strain: Not on file    Food insecurity     Worry: Not on file     Inability: Not on file    Transportation needs     Medical: Not on file     Non-medical: Not on file   Tobacco Use    Smoking status: Never Smoker    Smokeless tobacco: Never Used Substance and Sexual Activity    Alcohol use: Yes     Alcohol/week: 2.0 - 3.0 standard drinks     Types: 2 - 3 Standard drinks or equivalent per week     Comment: couple of glasses of wine 1-2 times a week at the most    Drug use: Not Currently    Sexual activity: Not Currently   Lifestyle    Physical activity     Days per week: Not on file     Minutes per session: Not on file    Stress: Not on file   Relationships    Social connections     Talks on phone: Not on file     Gets together: Not on file     Attends Congregation service: Not on file     Active member of club or organization: Not on file     Attends meetings of clubs or organizations: Not on file     Relationship status: Not on file    Intimate partner violence     Fear of current or ex partner: Not on file     Emotionally abused: Not on file     Physically abused: Not on file     Forced sexual activity: Not on file   Other Topics Concern    Not on file   Social History Narrative    Not on file                ALLERGIES: Bactrim [sulfamethoprim]    Review of Systems   Constitutional: Negative for activity change, appetite change, chills and fever. HENT: Negative for congestion, rhinorrhea and sore throat. Respiratory: Negative for cough, shortness of breath and wheezing. Cardiovascular: Negative for chest pain. Gastrointestinal: Negative for abdominal pain, diarrhea, nausea and vomiting. Musculoskeletal: Negative for myalgias. Neurological: Negative for headaches. Vitals:    09/29/20 1509   Pulse: 69   Resp: 16   Temp: 98.3 °F (36.8 °C)   SpO2: 95%       Physical Exam  Vitals signs and nursing note reviewed. Constitutional:       General: She is not in acute distress. Appearance: She is well-developed. She is not diaphoretic. Pulmonary:      Effort: Pulmonary effort is normal. No respiratory distress. Breath sounds: Normal breath sounds. No stridor. No wheezing, rhonchi or rales.    Neurological:      Mental Status: She is alert. Psychiatric:         Behavior: Behavior normal.         Thought Content: Thought content normal.         Judgment: Judgment normal.         MDM    ICD-10-CM ICD-9-CM   1. Close exposure to 2019 novel coronavirus  Z20.828 V01.79       Orders Placed This Encounter    NOVEL CORONAVIRUS (COVID-19)     Scheduling Instructions:      1) Due to current limited availability of the COVID-19 PCR test, tests will be prioritized and may not be completed.              2) Order only if the test result will change clinical management or necessary for a return to mission-critical employment decision.              3) Print and instruct patient to adhere to CDC home isolation program. (Link Above)              4) Set up or refer patient for a monitoring program.              5) Have patient sign up for and leverage Siving Egil Kvalebergt (if not previously done). Order Specific Question:   Is this test for diagnosis or screening? Answer:   Screening     Order Specific Question:   Symptomatic for COVID-19 as defined by CDC? Answer:   No     Order Specific Question:   Hospitalized for COVID-19? Answer:   No     Order Specific Question:   Admitted to ICU for COVID-19? Answer:   No     Order Specific Question:   Employed in healthcare setting? Answer:   No     Order Specific Question:   Resident in a congregate (group) care setting? Answer:   No     Order Specific Question:   Pregnant? Answer:   No     Order Specific Question:   Previously tested for COVID-19? Answer:   No        Quarantine x 10 days  Deep breathing exercises  Tylenol prn  Increase fluids    If signs and symptoms become worse the pt is to go to the ER.          Procedures

## 2020-09-30 ENCOUNTER — TELEPHONE (OUTPATIENT)
Dept: INTERNAL MEDICINE CLINIC | Age: 75
End: 2020-09-30

## 2020-10-01 LAB — SARS-COV-2, NAA: NOT DETECTED

## 2020-11-19 ENCOUNTER — OFFICE VISIT (OUTPATIENT)
Dept: URGENT CARE | Age: 75
End: 2020-11-19
Payer: MEDICARE

## 2020-11-19 VITALS — RESPIRATION RATE: 16 BRPM | HEART RATE: 66 BPM | OXYGEN SATURATION: 94 % | TEMPERATURE: 98.5 F

## 2020-11-19 DIAGNOSIS — Z20.822 ENCOUNTER FOR LABORATORY TESTING FOR COVID-19 VIRUS: Primary | ICD-10-CM

## 2020-11-19 PROCEDURE — G8419 CALC BMI OUT NRM PARAM NOF/U: HCPCS | Performed by: EMERGENCY MEDICINE

## 2020-11-19 PROCEDURE — G8400 PT W/DXA NO RESULTS DOC: HCPCS | Performed by: EMERGENCY MEDICINE

## 2020-11-19 PROCEDURE — G8536 NO DOC ELDER MAL SCRN: HCPCS | Performed by: EMERGENCY MEDICINE

## 2020-11-19 PROCEDURE — 1101F PT FALLS ASSESS-DOCD LE1/YR: CPT | Performed by: EMERGENCY MEDICINE

## 2020-11-19 PROCEDURE — 99213 OFFICE O/P EST LOW 20 MIN: CPT | Performed by: EMERGENCY MEDICINE

## 2020-11-19 PROCEDURE — G8432 DEP SCR NOT DOC, RNG: HCPCS | Performed by: EMERGENCY MEDICINE

## 2020-11-19 PROCEDURE — 1090F PRES/ABSN URINE INCON ASSESS: CPT | Performed by: EMERGENCY MEDICINE

## 2020-11-19 PROCEDURE — G8427 DOCREV CUR MEDS BY ELIG CLIN: HCPCS | Performed by: EMERGENCY MEDICINE

## 2020-11-19 NOTE — PROGRESS NOTES
Pt here with no known COVID exposures and no sx. This patient was seen in Flu Clinic at [address] [place] [address] Urgent Care while outdoors at their vehicle due to COVID-19 pandemic with PPE and focused examination in order to decrease community viral transmission       The history is provided by the patient and the spouse. Past Medical History:   Diagnosis Date    GERD (gastroesophageal reflux disease)     Mixed hyperlipidemia 2/28/2020    OAB (overactive bladder) 2/28/2020        Past Surgical History:   Procedure Laterality Date    COLONOSCOPY N/A 9/7/2018    [facility]COPY    HX CATARACT REMOVAL Bilateral     HX CHOLECYSTECTOMY      HX GI      surgery for rectal fissure    HX GI      colonoscopy - colon polyp    HX TONSILLECTOMY           Family History   Problem Relation Age of Onset    Stroke Mother         ? may d/t cipro and coumadin combination    Heart Disease Mother     Cancer Father         pancreatic    Heart Attack Father     Cancer Maternal Grandmother         cervical    Cancer Paternal Grandmother         brain tumor    Colon Polyps Brother     Colon Cancer Paternal Aunt     Macular Degen Paternal Aunt         Social History     Socioeconomic History    Marital status:      Spouse name: Not on file    Number of children: Not on file    Years of education: Not on file    Highest education level: Not on file   Occupational History    Not on file   Social Needs    Financial resource strain: Not on file    Food insecurity     Worry: Not on file     Inability: Not on file    Transportation needs     Medical: Not on file     Non-medical: Not on file   Tobacco Use    Smoking status: Never Smoker    Smokeless tobacco: Never Used   Substance and Sexual Activity    Alcohol use:  Yes     Alcohol/week: 2.0 - 3.0 standard drinks     Types: 2 - 3 Standard drinks or equivalent per week     Comment: couple of glasses of wine 1-2 times a week at the most    Drug use: Not Currently    Sexual activity: Not Currently   Lifestyle    Physical activity     Days per week: Not on file     Minutes per session: Not on file    Stress: Not on file   Relationships    Social connections     Talks on phone: Not on file     Gets together: Not on file     Attends Confucianism service: Not on file     Active member of club or organization: Not on file     Attends meetings of clubs or organizations: Not on file     Relationship status: Not on file    Intimate partner violence     Fear of current or ex partner: Not on file     Emotionally abused: Not on file     Physically abused: Not on file     Forced sexual activity: Not on file   Other Topics Concern    Not on file   Social History Narrative    Not on file                ALLERGIES: Bactrim [sulfamethoprim]    Review of Systems   Constitutional: Negative for chills and fever. HENT: Negative for congestion, ear pain, rhinorrhea, sore throat and trouble swallowing. Respiratory: Negative for cough and shortness of breath. Cardiovascular: Negative for chest pain. Gastrointestinal: Negative for abdominal pain, diarrhea, nausea and vomiting. Musculoskeletal: Negative for myalgias. Skin: Negative for rash. Neurological: Negative for headaches. Vitals:    11/19/20 1320 11/19/20 1337   Pulse: 66    Resp: 16    Temp: 98.5 °F (36.9 °C)    SpO2: 93% 94%       Physical Exam  Vitals signs and nursing note reviewed. Constitutional:       General: She is not in acute distress. Appearance: Normal appearance. She is normal weight. She is not ill-appearing, toxic-appearing or diaphoretic. Comments: Pt examined in a vehicle     HENT:      Nose: No congestion or rhinorrhea. Mouth/Throat:      Mouth: Mucous membranes are moist.      Pharynx: Oropharynx is clear. No oropharyngeal exudate or posterior oropharyngeal erythema.    Cardiovascular:      Rate and Rhythm: Normal rate and regular rhythm. Heart sounds: Normal heart sounds. Pulmonary:      Effort: Pulmonary effort is normal. No respiratory distress. Breath sounds: Normal breath sounds. No stridor. No wheezing, rhonchi or rales. Abdominal:      General: Bowel sounds are normal.   Neurological:      Mental Status: She is alert and oriented to person, place, and time. Psychiatric:         Mood and Affect: Mood normal.         Behavior: Behavior normal.         MDM    ICD-10-CM ICD-9-CM    1. Encounter for laboratory testing for COVID-19 virus  Z20.828 V01.79 NOVEL CORONAVIRUS (COVID-19)     No orders of the defined types were placed in this encounter. The patient's condition and possible alternative diagnoses were discussed with the patient and they verbalized understanding. The patient is to follow up with their primary care doctor for continued care. If signs and symptoms persist or become worse or new symptoms develop, the pt is to go immediately to the emergency department. Any new medications that may have been written for should be taken as directed but should always be discussed with the primary care physician and pharmacist. This was communicated to the patient. Pt instructed to quarantine until COVID testing results are back and then duration of quarantine will depend on result, current recommendations and symptoms. The patient is to get immediate re-evaluation for any new or worsening symptoms. They are to quarantine from other household members. It was recommended they stay hydrated and practice deep breathing exercises.          Procedures

## 2020-11-22 LAB — SARS-COV-2, NAA: NOT DETECTED

## 2021-08-31 LAB
CREATININE, EXTERNAL: 0.74
HBA1C MFR BLD HPLC: 5.2 %
LDL-C, EXTERNAL: 87

## 2021-09-01 ENCOUNTER — OFFICE VISIT (OUTPATIENT)
Dept: INTERNAL MEDICINE CLINIC | Age: 76
End: 2021-09-01
Payer: MEDICARE

## 2021-09-01 VITALS
DIASTOLIC BLOOD PRESSURE: 78 MMHG | HEART RATE: 85 BPM | TEMPERATURE: 98.4 F | WEIGHT: 169.87 LBS | OXYGEN SATURATION: 95 % | HEIGHT: 65 IN | BODY MASS INDEX: 28.3 KG/M2 | SYSTOLIC BLOOD PRESSURE: 131 MMHG | RESPIRATION RATE: 16 BRPM

## 2021-09-01 DIAGNOSIS — R35.0 FREQUENCY OF URINATION: Primary | ICD-10-CM

## 2021-09-01 LAB
BILIRUB UR QL STRIP: NEGATIVE
GLUCOSE UR-MCNC: NEGATIVE MG/DL
KETONES P FAST UR STRIP-MCNC: NEGATIVE MG/DL
PH UR STRIP: 5 [PH] (ref 4.6–8)
PROT UR QL STRIP: NEGATIVE
SP GR UR STRIP: 1.03 (ref 1–1.03)
UA UROBILINOGEN AMB POC: NORMAL (ref 0.2–1)
URINALYSIS CLARITY POC: CLEAR
URINALYSIS COLOR POC: YELLOW
URINE BLOOD POC: NEGATIVE
URINE LEUKOCYTES POC: NEGATIVE
URINE NITRITES POC: NEGATIVE

## 2021-09-01 PROCEDURE — G8536 NO DOC ELDER MAL SCRN: HCPCS | Performed by: INTERNAL MEDICINE

## 2021-09-01 PROCEDURE — G8510 SCR DEP NEG, NO PLAN REQD: HCPCS | Performed by: INTERNAL MEDICINE

## 2021-09-01 PROCEDURE — G8419 CALC BMI OUT NRM PARAM NOF/U: HCPCS | Performed by: INTERNAL MEDICINE

## 2021-09-01 PROCEDURE — G8400 PT W/DXA NO RESULTS DOC: HCPCS | Performed by: INTERNAL MEDICINE

## 2021-09-01 PROCEDURE — G8427 DOCREV CUR MEDS BY ELIG CLIN: HCPCS | Performed by: INTERNAL MEDICINE

## 2021-09-01 PROCEDURE — 81002 URINALYSIS NONAUTO W/O SCOPE: CPT | Performed by: INTERNAL MEDICINE

## 2021-09-01 PROCEDURE — 99213 OFFICE O/P EST LOW 20 MIN: CPT | Performed by: INTERNAL MEDICINE

## 2021-09-01 PROCEDURE — 1090F PRES/ABSN URINE INCON ASSESS: CPT | Performed by: INTERNAL MEDICINE

## 2021-09-01 PROCEDURE — 1101F PT FALLS ASSESS-DOCD LE1/YR: CPT | Performed by: INTERNAL MEDICINE

## 2021-09-01 RX ORDER — KETOCONAZOLE 20 MG/G
CREAM TOPICAL
COMMUNITY
Start: 2021-05-26

## 2021-09-01 NOTE — PROGRESS NOTES
Chief Complaint   Patient presents with    Bladder Infection     frequency          1. Have you been to the ER, urgent care clinic since your last visit? Hospitalized since your last visit? No    2. Have you seen or consulted any other health care providers outside of the [address] [place] [address] since your last visit? Include any pap smears or colon screening.  No

## 2021-09-01 NOTE — PROGRESS NOTES
HPI:  Edelmira Gaines is a 67y.o. year old female who is here for a several day history of some increased urinary frequency with nocturia about 3 times per night. No fevers or chills. No sweats. No change in bowel habits. No nausea or vomiting. Past Medical History:   Diagnosis Date    GERD (gastroesophageal reflux disease)     Mixed hyperlipidemia 2/28/2020    OAB (overactive bladder) 2/28/2020       Past Surgical History:   Procedure Laterality Date    COLONOSCOPY N/A 9/7/2018    COLONOSCOPY performed by Tod Darby MD at [facility] ENDOSCOPY    HX CATARACT REMOVAL Bilateral     HX CHOLECYSTECTOMY      HX GI      surgery for rectal fissure    HX GI      colonoscopy - colon polyp    HX TONSILLECTOMY         Prior to Admission medications    Medication Sig Start Date End Date Taking? Authorizing Provider   ketoconazole (NIZORAL) 2 % topical cream APPLY EXTERNALLY TO RASH TWICE DAILY FOR 2-3 WEEKS 5/26/21  Yes Provider, Historical   aspirin 81 mg chewable tablet Take 81 mg by mouth daily. Yes Provider, Historical   doxycycline (PERIOSTAT) 20 mg tablet Take 40 mg by mouth daily. Yes Provider, Historical   cholecalciferol (VITAMIN D3) 1,000 unit cap Take 1,000 Units by mouth daily. Yes Provider, Historical   atorvastatin (LIPITOR) 20 mg tablet Take 10 mg by mouth daily. Yes Provider, Historical   famotidine (PEPCID) 40 mg tablet Take 40 mg by mouth daily. 9/1/21  Provider, Historical   mirabegron ER (MYRBETRIQ) 50 mg ER tablet Take 50 mg by mouth daily.   9/1/21  Provider, Historical       Social History     Socioeconomic History    Marital status:      Spouse name: Not on file    Number of children: Not on file    Years of education: Not on file    Highest education level: Not on file   Occupational History    Not on file   Tobacco Use    Smoking status: Never Smoker    Smokeless tobacco: Never Used   Vaping Use    Vaping Use: Never used   Substance and Sexual Activity    Alcohol use: Yes     Alcohol/week: 2.0 - 3.0 standard drinks     Types: 2 - 3 Standard drinks or equivalent per week     Comment: couple of glasses of wine 1-2 times a week at the most    Drug use: Not Currently    Sexual activity: Not Currently   Other Topics Concern    Not on file   Social History Narrative    Not on file     Social Determinants of Health     Financial Resource Strain:     Difficulty of Paying Living Expenses:    Food Insecurity:     Worried About Running Out of Food in the Last Year:     Meenakshi of Food in the Last Year:    Transportation Needs:     Lack of Transportation (Medical):      Lack of Transportation (Non-Medical):    Physical Activity:     Days of Exercise per Week:     Minutes of Exercise per Session:    Stress:     Feeling of Stress :    Social Connections:     Frequency of Communication with Friends and Family:     Frequency of Social Gatherings with Friends and Family:     Attends Holiness Services:     Active Member of Clubs or Organizations:     Attends Club or Organization Meetings:     Marital Status:    Intimate Partner Violence:     Fear of Current or Ex-Partner:     Emotionally Abused:     Physically Abused:     Sexually Abused:           ROS  Per HPI    Visit Vitals  /78 (BP 1 Location: Left upper arm, BP Patient Position: Sitting, BP Cuff Size: Adult)   Pulse 85   Temp 98.4 °F (36.9 °C) (Temporal)   Resp 16   Ht 5' 5\" (1.651 m)   Wt 169 lb 13.9 oz (77.1 kg)   SpO2 95%   BMI 28.27 kg/m²         Physical Exam   Physical Examination: General appearance - alert, well appearing, and in no distress  Chest - clear to auscultation, no wheezes, rales or rhonchi, symmetric air entry  Heart - normal rate, regular rhythm, normal S1, S2, no murmurs, rubs, clicks or gallops  Back exam - full range of motion, no tenderness, palpable spasm or pain on motion  Results for orders placed or performed in visit on 09/01/21   AMB POC URINALYSIS DIP STICK MANUAL W/O MICRO Result Value Ref Range    Color (UA POC) Yellow     Clarity (UA POC) Clear     Glucose (UA POC) Negative Negative    Bilirubin (UA POC) Negative Negative    Ketones (UA POC) Negative Negative    Specific gravity (UA POC) 1.030 1.001 - 1.035    Blood (UA POC) Negative Negative    pH (UA POC) 5.0 4.6 - 8.0    Protein (UA POC) Negative Negative    Urobilinogen (UA POC) 0.2 mg/dL 0.2 - 1    Nitrites (UA POC) Negative Negative    Leukocyte esterase (UA POC) Negative Negative         Assessment/Plan:  Diagnoses and all orders for this visit:    1. Frequency of urination? UTI. Will await urine culture. No evidence on urinalysis today of infection.  -     AMB POC URINALYSIS DIP STICK MANUAL W/O MICRO  -     URINALYSIS W/MICROSCOPIC; Future  -     CULTURE, URINE; Future              Advised her to call back or return to office if symptoms worsen/change/persist.  Discussed expected course/resolution/complications of diagnosis in detail with patient. Medication risks/benefits/costs/interactions/alternatives discussed with patient. She was given an after visit summary which includes diagnoses, current medications, & vitals. She expressed understanding with the diagnosis and plan.

## 2021-09-02 LAB
APPEARANCE UR: ABNORMAL
BACTERIA URNS QL MICRO: ABNORMAL /HPF
BILIRUB UR QL: NEGATIVE
CAOX CRY URNS QL MICRO: ABNORMAL
COLOR UR: ABNORMAL
EPITH CASTS URNS QL MICRO: ABNORMAL /LPF
GLUCOSE UR STRIP.AUTO-MCNC: NEGATIVE MG/DL
HGB UR QL STRIP: NEGATIVE
HYALINE CASTS URNS QL MICRO: >20 /LPF (ref 0–5)
KETONES UR QL STRIP.AUTO: ABNORMAL MG/DL
LEUKOCYTE ESTERASE UR QL STRIP.AUTO: ABNORMAL
NITRITE UR QL STRIP.AUTO: NEGATIVE
PH UR STRIP: 5 [PH] (ref 5–8)
PROT UR STRIP-MCNC: NEGATIVE MG/DL
RBC #/AREA URNS HPF: ABNORMAL /HPF (ref 0–5)
SP GR UR REFRACTOMETRY: >1.03
UROBILINOGEN UR QL STRIP.AUTO: 0.2 EU/DL (ref 0.2–1)
WBC URNS QL MICRO: >100 /HPF (ref 0–4)

## 2021-09-03 LAB
BACTERIA SPEC CULT: NORMAL
SERVICE CMNT-IMP: NORMAL

## 2021-10-25 ENCOUNTER — TRANSCRIBE ORDER (OUTPATIENT)
Dept: REGISTRATION | Age: 76
End: 2021-10-25

## 2021-10-25 ENCOUNTER — HOSPITAL ENCOUNTER (OUTPATIENT)
Dept: PREADMISSION TESTING | Age: 76
Discharge: HOME | End: 2021-10-25
Payer: MEDICARE

## 2021-10-25 DIAGNOSIS — Z01.812 PRE-PROCEDURE LAB EXAM: ICD-10-CM

## 2021-10-25 DIAGNOSIS — Z01.812 PRE-PROCEDURE LAB EXAM: Primary | ICD-10-CM

## 2021-10-25 PROCEDURE — U0005 INFEC AGEN DETEC AMPLI PROBE: HCPCS

## 2021-10-26 LAB
SARS-COV-2, XPLCVT: NOT DETECTED
SOURCE, COVRS: NORMAL

## 2021-10-29 ENCOUNTER — HOSPITAL ENCOUNTER (OUTPATIENT)
Age: 76
Setting detail: OUTPATIENT SURGERY
Discharge: HOME | End: 2021-10-29
Attending: COLON & RECTAL SURGERY | Admitting: COLON & RECTAL SURGERY
Payer: MEDICARE

## 2021-10-29 ENCOUNTER — ANESTHESIA (OUTPATIENT)
Dept: ENDOSCOPY | Age: 76
End: 2021-10-29
Payer: MEDICARE

## 2021-10-29 ENCOUNTER — ANESTHESIA EVENT (OUTPATIENT)
Dept: ENDOSCOPY | Age: 76
End: 2021-10-29
Payer: MEDICARE

## 2021-10-29 VITALS
TEMPERATURE: 97.3 F | SYSTOLIC BLOOD PRESSURE: 111 MMHG | RESPIRATION RATE: 14 BRPM | DIASTOLIC BLOOD PRESSURE: 82 MMHG | HEIGHT: 65 IN | WEIGHT: 160 LBS | OXYGEN SATURATION: 97 % | HEART RATE: 67 BPM | BODY MASS INDEX: 26.66 KG/M2

## 2021-10-29 PROCEDURE — 74011000250 HC RX REV CODE- 250: Performed by: STUDENT IN AN ORGANIZED HEALTH CARE EDUCATION/TRAINING PROGRAM

## 2021-10-29 PROCEDURE — 74011250636 HC RX REV CODE- 250/636: Performed by: STUDENT IN AN ORGANIZED HEALTH CARE EDUCATION/TRAINING PROGRAM

## 2021-10-29 PROCEDURE — 74011250637 HC RX REV CODE- 250/637: Performed by: COLON & RECTAL SURGERY

## 2021-10-29 PROCEDURE — 76040000019: Performed by: COLON & RECTAL SURGERY

## 2021-10-29 PROCEDURE — 76060000031 HC ANESTHESIA FIRST 0.5 HR: Performed by: COLON & RECTAL SURGERY

## 2021-10-29 RX ORDER — EPINEPHRINE 0.1 MG/ML
1 INJECTION INTRACARDIAC; INTRAVENOUS
Status: DISCONTINUED | OUTPATIENT
Start: 2021-10-29 | End: 2021-10-29 | Stop reason: HOSPADM

## 2021-10-29 RX ORDER — SODIUM CHLORIDE 9 MG/ML
50 INJECTION, SOLUTION INTRAVENOUS CONTINUOUS
Status: DISCONTINUED | OUTPATIENT
Start: 2021-10-29 | End: 2021-10-29 | Stop reason: HOSPADM

## 2021-10-29 RX ORDER — PROPOFOL 10 MG/ML
INJECTION, EMULSION INTRAVENOUS AS NEEDED
Status: DISCONTINUED | OUTPATIENT
Start: 2021-10-29 | End: 2021-10-29 | Stop reason: HOSPADM

## 2021-10-29 RX ORDER — DEXTROMETHORPHAN/PSEUDOEPHED 2.5-7.5/.8
1.2 DROPS ORAL
Status: DISCONTINUED | OUTPATIENT
Start: 2021-10-29 | End: 2021-10-29 | Stop reason: HOSPADM

## 2021-10-29 RX ORDER — NALOXONE HYDROCHLORIDE 0.4 MG/ML
0.4 INJECTION, SOLUTION INTRAMUSCULAR; INTRAVENOUS; SUBCUTANEOUS
Status: DISCONTINUED | OUTPATIENT
Start: 2021-10-29 | End: 2021-10-29 | Stop reason: HOSPADM

## 2021-10-29 RX ORDER — SODIUM CHLORIDE 9 MG/ML
INJECTION, SOLUTION INTRAVENOUS
Status: DISCONTINUED | OUTPATIENT
Start: 2021-10-29 | End: 2021-10-29 | Stop reason: HOSPADM

## 2021-10-29 RX ORDER — SODIUM CHLORIDE 0.9 % (FLUSH) 0.9 %
5-40 SYRINGE (ML) INJECTION AS NEEDED
Status: DISCONTINUED | OUTPATIENT
Start: 2021-10-29 | End: 2021-10-29 | Stop reason: HOSPADM

## 2021-10-29 RX ORDER — SODIUM CHLORIDE 0.9 % (FLUSH) 0.9 %
5-40 SYRINGE (ML) INJECTION EVERY 8 HOURS
Status: DISCONTINUED | OUTPATIENT
Start: 2021-10-29 | End: 2021-10-29 | Stop reason: HOSPADM

## 2021-10-29 RX ORDER — FLUMAZENIL 0.1 MG/ML
0.2 INJECTION INTRAVENOUS
Status: DISCONTINUED | OUTPATIENT
Start: 2021-10-29 | End: 2021-10-29 | Stop reason: HOSPADM

## 2021-10-29 RX ORDER — LIDOCAINE HYDROCHLORIDE 20 MG/ML
INJECTION, SOLUTION EPIDURAL; INFILTRATION; INTRACAUDAL; PERINEURAL AS NEEDED
Status: DISCONTINUED | OUTPATIENT
Start: 2021-10-29 | End: 2021-10-29 | Stop reason: HOSPADM

## 2021-10-29 RX ORDER — ATROPINE SULFATE 0.1 MG/ML
0.5 INJECTION INTRAVENOUS
Status: DISCONTINUED | OUTPATIENT
Start: 2021-10-29 | End: 2021-10-29 | Stop reason: HOSPADM

## 2021-10-29 RX ADMIN — PROPOFOL 50 MG: 10 INJECTION, EMULSION INTRAVENOUS at 12:17

## 2021-10-29 RX ADMIN — PROPOFOL 50 MG: 10 INJECTION, EMULSION INTRAVENOUS at 12:22

## 2021-10-29 RX ADMIN — PROPOFOL 50 MG: 10 INJECTION, EMULSION INTRAVENOUS at 12:20

## 2021-10-29 RX ADMIN — LIDOCAINE HYDROCHLORIDE 40 MG: 20 INJECTION, SOLUTION EPIDURAL; INFILTRATION; INTRACAUDAL; PERINEURAL at 12:17

## 2021-10-29 RX ADMIN — PROPOFOL 50 MG: 10 INJECTION, EMULSION INTRAVENOUS at 12:25

## 2021-10-29 RX ADMIN — SODIUM CHLORIDE: 900 INJECTION, SOLUTION INTRAVENOUS at 12:14

## 2021-10-29 NOTE — PROGRESS NOTES

## 2021-10-29 NOTE — ANESTHESIA PREPROCEDURE EVALUATION
Anesthetic History   No history of anesthetic complications            Review of Systems / Medical History  Patient summary reviewed, nursing notes reviewed and pertinent labs reviewed    Pulmonary  Within defined limits                 Neuro/Psych   Within defined limits           Cardiovascular                  Exercise tolerance: >4 METS     GI/Hepatic/Renal     GERD          Comments: schatki ring  gastritis Endo/Other  Within defined limits           Other Findings              Physical Exam    Airway  Mallampati: I  TM Distance: 4 - 6 cm  Neck ROM: normal range of motion   Mouth opening: Normal     Cardiovascular    Rhythm: regular  Rate: normal         Dental  No notable dental hx       Pulmonary  Breath sounds clear to auscultation               Abdominal         Other Findings            Anesthetic Plan    ASA: 2  Anesthesia type: MAC          Induction: Intravenous  Anesthetic plan and risks discussed with: Patient

## 2021-10-29 NOTE — OP NOTES
[phone]    Colonoscopy Procedure Note      Indications: Previous adenomatous polyp    : Chelsea Kaplan MD    Staff: Endoscopy RN-1: Megha Mayen RN  Endoscopy RN-2: Isaura Kidd RN    Referring Provider: Verena Sheets MD     Anesthesia/Sedation: MAC provided by Anesthesia    Pre-Procedure Exam:  Airway: clear   Heart: normal S1and S2    Lungs: clear bilateral  Abdomen: soft, nontender, bowel sounds present and normal in all quadrants   Mental Status: awake, alert, and oriented to person, place, and time      Procedure in Detail:  Informed consent was obtained for the procedure, including sedation. Risks of perforation, hemorrhage, adverse drug reaction, and aspiration were discussed. The patient was placed in the left lateral decubitus position. Based on the pre-procedure assessment, including review of the patient's medical history, medications, allergies, and review of systems, he had been deemed to be an appropriate candidate for moderate sedation; he was therefore sedated with the medications listed above. The patient was monitored continuously with ECG tracing, pulse oximetry, blood pressure monitoring, and direct observations. A rectal examination was performed. The [id number] was inserted into the rectum and advanced under direct vision to the cecum, which was identified by the ileocecal valve and appendiceal orifice. The quality of the colonic preparation was excellent. A careful inspection was made as the colonoscope was withdrawn, including a retroflexed view of the rectum; findings and interventions are described below. Appropriate photodocumentation was obtained.     Findings:   Rectum:   Normal  Sigmoid:     - Excavated lesions:     - Diverticulosis  Descending Colon:   Normal  Transverse Colon:   Normal  Ascending Colon:   Normal  Cecum:   Normal          Specimens: * No specimens in log *     EBL: None    Complications: None; patient tolerated the procedure well. Attending Attestation: I performed the procedure. Recommendations:   - Repeat colonoscopy in 5 years. Discharge Disposition: Home in the company of a  when able to ambulate.     Any Pelletier MD  10/29/2021 12:33 PM

## 2021-10-29 NOTE — ANESTHESIA POSTPROCEDURE EVALUATION
Post-Anesthesia Evaluation and Assessment    Patient: Lex Delgado MRN: [id number]  SSN: [id number]    YOB: 1945  Age: 67 y.o. Sex: female      I have evaluated the patient and they are stable and ready for discharge from the PACU. Cardiovascular Function/Vital Signs  Visit Vitals  /82   Pulse 67   Temp 36.3 °C (97.3 °F)   Resp 14   Ht 5' 5\" (1.651 m)   Wt 72.6 kg (160 lb)   SpO2 97%   BMI 26.63 kg/m²       Patient is status post MAC anesthesia for Procedure(s):  COLONOSCOPY   :-.    Nausea/Vomiting: None    Postoperative hydration reviewed and adequate. Pain:  Pain Scale 1: Numeric (0 - 10) (10/29/21 1250)  Pain Intensity 1: 0 (10/29/21 1250)   Managed    Neurological Status: At baseline    Mental Status, Level of Consciousness: Alert and  oriented to person, place, and time    Pulmonary Status:   O2 Device: None (Room air) (10/29/21 1250)   Adequate oxygenation and airway patent    Complications related to anesthesia: None    Post-anesthesia assessment completed.  No concerns    Signed By: Thanh Perez DO     October 29, 2021

## 2021-10-29 NOTE — ROUTINE PROCESS
Gideon Novak  1945  [id number]    Situation:  Verbal report received from: Julius Shi  Procedure: Procedure(s):  COLONOSCOPY   :-    Background:    Preoperative diagnosis: History of colon polyps [Z86.010]  Postoperative diagnosis: Diverticulosis  Hemorrhoids    :  Dr. Edith Sanderson  Assistant(s): Endoscopy RN-1: Daniela Mcdermott RN  Endoscopy RN-2: Ishaan Alvarado RN    Specimens:  no  H.  Pylori  no    Assessment:  Intra-procedure medications     Anesthesia gave intra-procedure sedation and medications, see anesthesia flow sheet    Intravenous fluids: NS@ KVO     Vital signs stable     Abdominal assessment: round and soft     Recommendation:  Discharge patient per MD order  Family -yes  Permission to share finding with family  -yes

## 2021-10-29 NOTE — DISCHARGE INSTRUCTIONS
Debi Elias  [id number]  1945    COLON DISCHARGE INSTRUCTIONS  Discomfort:  Redness at IV site- apply warm compress to area; if redness or soreness persist- contact your physician  There may be a slight amount of blood passed from the rectum  Gaseous discomfort- walking, belching will help relieve any discomfort  You may not operate a vehicle for 12 hours  You may not engage in an occupation involving machinery or appliances for rest of today  You may not drink alcoholic beverages for at least 12 hours  Avoid making any critical decisions for at least 24 hour  DIET:   High fiber diet. - however -  remember your colon is empty and a heavy meal will produce gas. Avoid these foods:  vegetables, fried / greasy foods, carbonated drinks for today    MEDICATIONS:  resume       ACTIVITY:  You may resume your normal daily activities it is recommended that you spend the remainder of the day resting -  avoid any strenuous activity. CALL M.D. ANY SIGN OF:   Increasing pain, nausea, vomiting  Abdominal distension (swelling)  New increased bleeding (oral or rectal)  Fever (chills)  Pain in chest area  Bloody discharge from nose or mouth  Shortness of breath     Follow-up Instructions:   Call Jaguar Cunningham MD if any questions or problems. Telephone # [phone]  Biopsy results will be available in  7 to10 days  Should have a repeat colonoscopy in 5 years. COLONOSCOPY FINDINGS:  Your colonoscopy showed: sigmoid diverticulosis.

## 2021-10-29 NOTE — H&P
Colon and Rectal Surgery History and Physical    Subjective:      Arnold Markham is a 67 y.o. female who has hx louann 2018    Patient Active Problem List    Diagnosis Date Noted    OAB (overactive bladder) 02/28/2020    GERD (gastroesophageal reflux disease) 02/28/2020    Mixed hyperlipidemia 02/28/2020    Fasting hyperglycemia 02/28/2020     Past Medical History:   Diagnosis Date    GERD (gastroesophageal reflux disease)     Mixed hyperlipidemia 2/28/2020    OAB (overactive bladder) 2/28/2020      Past Surgical History:   Procedure Laterality Date    COLONOSCOPY N/A 9/7/2018    COLONOSCOPY performed by Be Ferreira MD at [address] HX CATARACT REMOVAL Bilateral     HX CHOLECYSTECTOMY      HX GI      surgery for rectal fissure    HX GI      colonoscopy - colon polyp    HX TONSILLECTOMY        Social History     Tobacco Use    Smoking status: Never Smoker    Smokeless tobacco: Never Used   Substance Use Topics    Alcohol use: Yes     Alcohol/week: 2.0 - 3.0 standard drinks     Types: 2 - 3 Standard drinks or equivalent per week     Comment: couple of glasses of wine 1-2 times a week at the most      Family History   Problem Relation Age of Onset    Stroke Mother         ? may d/t cipro and coumadin combination    Heart Disease Mother     Cancer Father         pancreatic    Heart Attack Father     Cancer Maternal Grandmother         cervical    Cancer Paternal Grandmother         brain tumor    Colon Polyps Brother     Colon Cancer Paternal Aunt     Macular Degen Paternal Aunt       Prior to Admission medications    Medication Sig Start Date End Date Taking? Authorizing Provider   aspirin 81 mg chewable tablet Take 81 mg by mouth daily. Yes Provider, Historical   doxycycline (PERIOSTAT) 20 mg tablet Take 40 mg by mouth daily. Yes Provider, Historical   cholecalciferol (VITAMIN D3) 1,000 unit cap Take 1,000 Units by mouth daily.    Yes Provider, Historical   atorvastatin (LIPITOR) 20 mg tablet Take 10 mg by mouth daily. Yes Provider, Historical   ketoconazole (NIZORAL) 2 % topical cream APPLY EXTERNALLY TO RASH TWICE DAILY FOR 2-3 WEEKS 5/26/21   Provider, Historical     Allergies   Allergen Reactions    Bactrim [Sulfamethoprim] Hives        Review of Systems:    A comprehensive review of systems was negative except for that written in the History of Present Illness. Objective:     Visit Vitals  Ht 5' 5\" (1.651 m)   Wt 72.6 kg (160 lb)   BMI 26.63 kg/m²        Physical Exam:   General:  Alert, cooperative, no distress, appears stated age. Head:  Normocephalic, without obvious abnormality, atraumatic. Eyes:  Conjunctivae/corneas clear. PERRL, EOMs intact. Ears:  Normal external ear canals both ears. Nose: Nares normal. Septum midline. No drainage. Throat: Lips, mucosa, and tongue normal. Teeth and gums normal.   Neck: Supple, symmetrical, trachea midline, no adenopathy   Back:   Symmetric, no curvature. ROM normal.   Lungs:   Clear   Chest wall:  No tenderness or deformity. Heart:  Regular rate and rhythm       Abdomen:   Soft, non-tender. Bowel sounds normal. No masses,  No organomegaly. Genitalia:  deferred       Extremities: Extremities normal, atraumatic, no cyanosis or edema. Skin: Skin color, texture, turgor normal. No rashes or lesions. Neurologic: CNII-XII intact. Normal strength, sensation and reflexes throughout. Imaging:  images and reports reviewed    Lab Review:  No results found for this or any previous visit (from the past 24 hour(s)). Labs and radiology: images and reports reviewed      Assessment:   Hx louann    Plan:     1. I recommend proceeding with colonoscopy. Treatment alternatives were discussed. 2. Discussed aspects of surgical intervention, methods, risks (including by not limited to infection, bleeding, hematoma, and perforation of the intestines or solid organs), and the risks of general anesthetic.  The patient understands the risks; any and all questions were answered to the patient's satisfaction.     Signed By: Bruna Baig MD     October 29, 2021

## 2022-03-18 PROBLEM — K21.9 GERD (GASTROESOPHAGEAL REFLUX DISEASE): Status: ACTIVE | Noted: 2020-02-28

## 2022-03-19 PROBLEM — N32.81 OAB (OVERACTIVE BLADDER): Status: ACTIVE | Noted: 2020-02-28

## 2022-03-19 PROBLEM — R73.01 FASTING HYPERGLYCEMIA: Status: ACTIVE | Noted: 2020-02-28

## 2022-03-19 PROBLEM — E78.2 MIXED HYPERLIPIDEMIA: Status: ACTIVE | Noted: 2020-02-28

## 2022-05-25 ENCOUNTER — TELEPHONE (OUTPATIENT)
Dept: INTERNAL MEDICINE CLINIC | Age: 77
End: 2022-05-25

## 2022-05-25 NOTE — TELEPHONE ENCOUNTER
Reason for call:   Pt would like to discuss not making in to the bathroom for the 1st time in he life.  She needs to discuss meds that she took and would like to start them again    Is this a new problem: yes     Date of last appointment:  9/1/2021     Can we respond via [employer]: no    Best call back number: [phone]

## 2022-05-26 ENCOUNTER — OFFICE VISIT (OUTPATIENT)
Dept: INTERNAL MEDICINE CLINIC | Age: 77
End: 2022-05-26
Payer: MEDICARE

## 2022-05-26 VITALS
RESPIRATION RATE: 16 BRPM | OXYGEN SATURATION: 97 % | WEIGHT: 170 LBS | DIASTOLIC BLOOD PRESSURE: 84 MMHG | HEART RATE: 84 BPM | SYSTOLIC BLOOD PRESSURE: 135 MMHG | TEMPERATURE: 97.8 F | BODY MASS INDEX: 28.32 KG/M2 | HEIGHT: 65 IN

## 2022-05-26 DIAGNOSIS — R35.0 FREQUENCY OF URINATION: ICD-10-CM

## 2022-05-26 DIAGNOSIS — E78.2 MIXED HYPERLIPIDEMIA: ICD-10-CM

## 2022-05-26 DIAGNOSIS — L71.9 ROSACEA: ICD-10-CM

## 2022-05-26 DIAGNOSIS — I10 ESSENTIAL HYPERTENSION: ICD-10-CM

## 2022-05-26 DIAGNOSIS — R39.9 UTI SYMPTOMS: Primary | ICD-10-CM

## 2022-05-26 LAB
BILIRUB UR QL STRIP: NEGATIVE
GLUCOSE UR-MCNC: NEGATIVE MG/DL
KETONES P FAST UR STRIP-MCNC: NEGATIVE MG/DL
PH UR STRIP: 5 [PH] (ref 4.6–8)
PROT UR QL STRIP: NEGATIVE
SP GR UR STRIP: 1.03 (ref 1–1.03)
UA UROBILINOGEN AMB POC: NORMAL (ref 0.2–1)
URINALYSIS CLARITY POC: CLEAR
URINALYSIS COLOR POC: YELLOW
URINE BLOOD POC: NEGATIVE
URINE LEUKOCYTES POC: NORMAL
URINE NITRITES POC: NEGATIVE

## 2022-05-26 PROCEDURE — G8427 DOCREV CUR MEDS BY ELIG CLIN: HCPCS | Performed by: INTERNAL MEDICINE

## 2022-05-26 PROCEDURE — G8752 SYS BP LESS 140: HCPCS | Performed by: INTERNAL MEDICINE

## 2022-05-26 PROCEDURE — 81003 URINALYSIS AUTO W/O SCOPE: CPT | Performed by: INTERNAL MEDICINE

## 2022-05-26 PROCEDURE — 1101F PT FALLS ASSESS-DOCD LE1/YR: CPT | Performed by: INTERNAL MEDICINE

## 2022-05-26 PROCEDURE — 99214 OFFICE O/P EST MOD 30 MIN: CPT | Performed by: INTERNAL MEDICINE

## 2022-05-26 PROCEDURE — G8536 NO DOC ELDER MAL SCRN: HCPCS | Performed by: INTERNAL MEDICINE

## 2022-05-26 PROCEDURE — G8754 DIAS BP LESS 90: HCPCS | Performed by: INTERNAL MEDICINE

## 2022-05-26 PROCEDURE — G8400 PT W/DXA NO RESULTS DOC: HCPCS | Performed by: INTERNAL MEDICINE

## 2022-05-26 PROCEDURE — G8510 SCR DEP NEG, NO PLAN REQD: HCPCS | Performed by: INTERNAL MEDICINE

## 2022-05-26 PROCEDURE — 1090F PRES/ABSN URINE INCON ASSESS: CPT | Performed by: INTERNAL MEDICINE

## 2022-05-26 PROCEDURE — G8419 CALC BMI OUT NRM PARAM NOF/U: HCPCS | Performed by: INTERNAL MEDICINE

## 2022-05-26 RX ORDER — ATORVASTATIN CALCIUM 20 MG/1
10 TABLET, FILM COATED ORAL DAILY
Qty: 45 TABLET | Refills: 3 | Status: SHIPPED | OUTPATIENT
Start: 2022-05-26

## 2022-05-26 RX ORDER — NITROFURANTOIN 25; 75 MG/1; MG/1
100 CAPSULE ORAL 2 TIMES DAILY
Qty: 10 CAPSULE | Refills: 0 | Status: SHIPPED | OUTPATIENT
Start: 2022-05-26

## 2022-05-26 RX ORDER — DOXYCYCLINE HYCLATE 20 MG
40 TABLET ORAL 2 TIMES DAILY
Qty: 60 TABLET | Refills: 5 | Status: SHIPPED | OUTPATIENT
Start: 2022-05-26

## 2022-05-26 NOTE — PROGRESS NOTES
HPI:  Aguilar Wilson is a 65y.o. year old female who is here for 4-day history of discomfort when she urinates. She had 1 episode where she had incontinence of urine try to get to the bathroom. No fevers or chills. No sweats. No nausea or vomiting. No change in bowel habits. She also has a history of rosacea and was previously taking antibiotics for that but has run out. She would like for me to refill those. She was also getting her Lipitor from her cardiologist and she would like to transition that to me as well. She has no cardiac history other than hyperlipidemia. Past Medical History:   Diagnosis Date    GERD (gastroesophageal reflux disease)     Mixed hyperlipidemia 2/28/2020    OAB (overactive bladder) 2/28/2020       Past Surgical History:   Procedure Laterality Date    COLONOSCOPY N/A 9/7/2018    COLONOSCOPY performed by Renee Hartman MD at [facility] ENDOSCOPY    COLONOSCOPY N/A 10/29/2021    COLONOSCOPY   :- performed by Concepción Mireles MD at [facility] ENDOSCOPY    HX CATARACT REMOVAL Bilateral     HX CHOLECYSTECTOMY      HX GI      surgery for rectal fissure    HX GI      colonoscopy - colon polyp    HX TONSILLECTOMY         Prior to Admission medications    Medication Sig Start Date End Date Taking? Authorizing Provider   atorvastatin (LIPITOR) 20 mg tablet Take 0.5 Tablets by mouth daily. 5/26/22  Yes Willie Jimenez MD   doxycycline (PERIOSTAT) 20 mg tablet Take 2 Tablets by mouth two (2) times a day. 5/26/22  Yes Willie Jimenez MD   nitrofurantoin, macrocrystal-monohydrate, (MACROBID) 100 mg capsule Take 1 Capsule by mouth two (2) times a day. 5/26/22  Yes Willie Jimenez MD   aspirin 81 mg chewable tablet Take 81 mg by mouth daily. Yes Provider, Historical   cholecalciferol (VITAMIN D3) 1,000 unit cap Take 1,000 Units by mouth daily.    Yes Provider, Historical   ketoconazole (NIZORAL) 2 % topical cream APPLY EXTERNALLY TO RASH TWICE DAILY FOR 2-3 WEEKS  Patient not taking: Reported on 5/26/2022 5/26/21   Provider, Historical   doxycycline (PERIOSTAT) 20 mg tablet Take 40 mg by mouth daily. Patient not taking: Reported on 5/26/2022 5/26/22  Provider, Historical   atorvastatin (LIPITOR) 20 mg tablet Take 10 mg by mouth daily. 5/26/22  Provider, Historical       Social History     Socioeconomic History    Marital status:      Spouse name: Not on file    Number of children: Not on file    Years of education: Not on file    Highest education level: Not on file   Occupational History    Not on file   Tobacco Use    Smoking status: Never Smoker    Smokeless tobacco: Never Used   Vaping Use    Vaping Use: Never used   Substance and Sexual Activity    Alcohol use: Yes     Alcohol/week: 2.0 - 3.0 standard drinks     Types: 2 - 3 Standard drinks or equivalent per week     Comment: couple of glasses of wine 1-2 times a week at the most    Drug use: Not Currently    Sexual activity: Not Currently   Other Topics Concern    Not on file   Social History Narrative    Not on file     Social Determinants of Health     Financial Resource Strain:     Difficulty of Paying Living Expenses: Not on file   Food Insecurity:     Worried About Running Out of Food in the Last Year: Not on file    Meenakshi of Food in the Last Year: Not on file   Transportation Needs:     Lack of Transportation (Medical): Not on file    Lack of Transportation (Non-Medical):  Not on file   Physical Activity:     Days of Exercise per Week: Not on file    Minutes of Exercise per Session: Not on file   Stress:     Feeling of Stress : Not on file   Social Connections:     Frequency of Communication with Friends and Family: Not on file    Frequency of Social Gatherings with Friends and Family: Not on file    Attends Sikhism Services: Not on file    Active Member of Clubs or Organizations: Not on file    Attends Club or Organization Meetings: Not on file    Marital Status: Not on file Intimate Partner Violence:     Fear of Current or Ex-Partner: Not on file    Emotionally Abused: Not on file    Physically Abused: Not on file    Sexually Abused: Not on file   Housing Stability:     Unable to Pay for Housing in the Last Year: Not on file    Number of Places Lived in the Last Year: Not on file    Unstable Housing in the Last Year: Not on file          ROS  Per HPI    Visit Vitals  /84   Pulse 84   Temp 97.8 °F (36.6 °C) (Temporal)   Resp 16   Ht 5' 5\" (1.651 m)   Wt 170 lb (77.1 kg)   SpO2 97%   BMI 28.29 kg/m²         Physical Exam   Physical Examination: General appearance - alert, well appearing, and in no distress  Neck - supple, no significant adenopathy  Lymphatics - no palpable lymphadenopathy, no hepatosplenomegaly  Chest - clear to auscultation, no wheezes, rales or rhonchi, symmetric air entry  Heart - normal rate, regular rhythm, normal S1, S2, no murmurs, rubs, clicks or gallops  Abdomen - soft, nontender, nondistended, no masses or organomegaly  Back exam - full range of motion, no tenderness, palpable spasm or pain on motion  Neurological - alert, oriented, normal speech, no focal findings or movement disorder noted  Skin - LESIONS NOTED: erythematous on the nose    Results for orders placed or performed in visit on 05/26/22   AMB POC URINALYSIS DIP STICK AUTO W/O MICRO   Result Value Ref Range    Color (UA POC) Yellow     Clarity (UA POC) Clear     Glucose (UA POC) Negative Negative    Bilirubin (UA POC) Negative Negative    Ketones (UA POC) Negative Negative    Specific gravity (UA POC) 1.030 1.001 - 1.035    Blood (UA POC) Negative Negative    pH (UA POC) 5.0 4.6 - 8.0    Protein (UA POC) Negative Negative    Urobilinogen (UA POC) 0.2 mg/dL 0.2 - 1    Nitrites (UA POC) Negative Negative    Leukocyte esterase (UA POC) 1+ Negative       Assessment/Plan:  Diagnoses and all orders for this visit:    1. UTI symptoms likely UTI. We will culture and treat with antibiotics. She will let me know if not improving.  -     CULTURE, URINE; Future  -     AMB POC URINALYSIS DIP STICK AUTO W/O MICRO    2. Frequency of urination    3. Essential hypertension-blood pressure well controlled on current meds. 4. Mixed hyperlipidemia-we will take over prescriptions for this. She will get yearly lab work done here. 5. Rosacea-refilled her doxycycline. I did advise her to get a new dermatologist for total-body skin exams. Other orders  -     atorvastatin (LIPITOR) 20 mg tablet; Take 0.5 Tablets by mouth daily. -     doxycycline (PERIOSTAT) 20 mg tablet; Take 2 Tablets by mouth two (2) times a day. -     nitrofurantoin, macrocrystal-monohydrate, (MACROBID) 100 mg capsule; Take 1 Capsule by mouth two (2) times a day. Advised her to call back or return to office if symptoms worsen/change/persist.  Discussed expected course/resolution/complications of diagnosis in detail with patient. Medication risks/benefits/costs/interactions/alternatives discussed with patient. She was given an after visit summary which includes diagnoses, current medications, & vitals. She expressed understanding with the diagnosis and plan.

## 2022-05-28 LAB
BACTERIA SPEC CULT: NORMAL
SERVICE CMNT-IMP: NORMAL

## 2022-11-18 RX ORDER — DOXYCYCLINE HYCLATE 20 MG
TABLET ORAL
Qty: 60 TABLET | Refills: 5 | Status: SHIPPED | OUTPATIENT
Start: 2022-11-18

## 2023-05-17 RX ORDER — DOXYCYCLINE HYCLATE 20 MG
TABLET ORAL
Qty: 60 TABLET | Refills: 1 | Status: SHIPPED | OUTPATIENT
Start: 2023-05-17

## 2023-06-25 RX ORDER — ATORVASTATIN CALCIUM 20 MG/1
TABLET, FILM COATED ORAL
Qty: 45 TABLET | Refills: 1 | Status: SHIPPED | OUTPATIENT
Start: 2023-06-25

## 2023-07-19 RX ORDER — DOXYCYCLINE HYCLATE 20 MG
TABLET ORAL
Qty: 60 TABLET | Refills: 1 | Status: SHIPPED | OUTPATIENT
Start: 2023-07-19

## 2023-09-07 ENCOUNTER — OFFICE VISIT (OUTPATIENT)
Age: 78
End: 2023-09-07
Payer: MEDICARE

## 2023-09-07 VITALS
RESPIRATION RATE: 18 BRPM | HEIGHT: 63 IN | TEMPERATURE: 97.6 F | BODY MASS INDEX: 30.3 KG/M2 | DIASTOLIC BLOOD PRESSURE: 87 MMHG | HEART RATE: 76 BPM | WEIGHT: 171 LBS | OXYGEN SATURATION: 94 % | SYSTOLIC BLOOD PRESSURE: 120 MMHG

## 2023-09-07 DIAGNOSIS — Z11.59 NEED FOR HEPATITIS C SCREENING TEST: ICD-10-CM

## 2023-09-07 DIAGNOSIS — Z00.00 MEDICARE ANNUAL WELLNESS VISIT, SUBSEQUENT: Primary | ICD-10-CM

## 2023-09-07 DIAGNOSIS — N39.0 URINARY TRACT INFECTION WITHOUT HEMATURIA, SITE UNSPECIFIED: ICD-10-CM

## 2023-09-07 DIAGNOSIS — I10 ESSENTIAL (PRIMARY) HYPERTENSION: ICD-10-CM

## 2023-09-07 DIAGNOSIS — K21.9 GASTROESOPHAGEAL REFLUX DISEASE, UNSPECIFIED WHETHER ESOPHAGITIS PRESENT: ICD-10-CM

## 2023-09-07 DIAGNOSIS — K22.2 ESOPHAGEAL STRICTURE: ICD-10-CM

## 2023-09-07 DIAGNOSIS — E78.2 MIXED HYPERLIPIDEMIA: ICD-10-CM

## 2023-09-07 LAB
ALBUMIN SERPL-MCNC: 3.9 G/DL (ref 3.5–5)
ALBUMIN/GLOB SERPL: 1.1 (ref 1.1–2.2)
ALP SERPL-CCNC: 92 U/L (ref 45–117)
ALT SERPL-CCNC: 19 U/L (ref 12–78)
ANION GAP SERPL CALC-SCNC: 4 MMOL/L (ref 5–15)
AST SERPL-CCNC: 20 U/L (ref 15–37)
BILIRUB SERPL-MCNC: 0.6 MG/DL (ref 0.2–1)
BUN SERPL-MCNC: 15 MG/DL (ref 6–20)
BUN/CREAT SERPL: 16 (ref 12–20)
CALCIUM SERPL-MCNC: 10 MG/DL (ref 8.5–10.1)
CHLORIDE SERPL-SCNC: 108 MMOL/L (ref 97–108)
CHOLEST SERPL-MCNC: 203 MG/DL
CO2 SERPL-SCNC: 30 MMOL/L (ref 21–32)
CREAT SERPL-MCNC: 0.95 MG/DL (ref 0.55–1.02)
GLOBULIN SER CALC-MCNC: 3.4 G/DL (ref 2–4)
GLUCOSE SERPL-MCNC: 100 MG/DL (ref 65–100)
HDLC SERPL-MCNC: 71 MG/DL
HDLC SERPL: 2.9 (ref 0–5)
LDLC SERPL CALC-MCNC: 112 MG/DL (ref 0–100)
POTASSIUM SERPL-SCNC: 5.6 MMOL/L (ref 3.5–5.1)
PROT SERPL-MCNC: 7.3 G/DL (ref 6.4–8.2)
SODIUM SERPL-SCNC: 142 MMOL/L (ref 136–145)
TRIGL SERPL-MCNC: 100 MG/DL
TSH SERPL DL<=0.05 MIU/L-ACNC: 1.05 UIU/ML (ref 0.36–3.74)
VLDLC SERPL CALC-MCNC: 20 MG/DL

## 2023-09-07 PROCEDURE — 99214 OFFICE O/P EST MOD 30 MIN: CPT | Performed by: INTERNAL MEDICINE

## 2023-09-07 PROCEDURE — G0439 PPPS, SUBSEQ VISIT: HCPCS | Performed by: INTERNAL MEDICINE

## 2023-09-07 PROCEDURE — 1090F PRES/ABSN URINE INCON ASSESS: CPT | Performed by: INTERNAL MEDICINE

## 2023-09-07 PROCEDURE — 1123F ACP DISCUSS/DSCN MKR DOCD: CPT | Performed by: INTERNAL MEDICINE

## 2023-09-07 PROCEDURE — G8400 PT W/DXA NO RESULTS DOC: HCPCS | Performed by: INTERNAL MEDICINE

## 2023-09-07 PROCEDURE — G8417 CALC BMI ABV UP PARAM F/U: HCPCS | Performed by: INTERNAL MEDICINE

## 2023-09-07 PROCEDURE — 3074F SYST BP LT 130 MM HG: CPT | Performed by: INTERNAL MEDICINE

## 2023-09-07 PROCEDURE — 3079F DIAST BP 80-89 MM HG: CPT | Performed by: INTERNAL MEDICINE

## 2023-09-07 PROCEDURE — G8427 DOCREV CUR MEDS BY ELIG CLIN: HCPCS | Performed by: INTERNAL MEDICINE

## 2023-09-07 PROCEDURE — 1036F TOBACCO NON-USER: CPT | Performed by: INTERNAL MEDICINE

## 2023-09-07 SDOH — ECONOMIC STABILITY: INCOME INSECURITY: HOW HARD IS IT FOR YOU TO PAY FOR THE VERY BASICS LIKE FOOD, HOUSING, MEDICAL CARE, AND HEATING?: NOT HARD AT ALL

## 2023-09-07 SDOH — ECONOMIC STABILITY: FOOD INSECURITY: WITHIN THE PAST 12 MONTHS, THE FOOD YOU BOUGHT JUST DIDN'T LAST AND YOU DIDN'T HAVE MONEY TO GET MORE.: NEVER TRUE

## 2023-09-07 SDOH — ECONOMIC STABILITY: HOUSING INSECURITY
IN THE LAST 12 MONTHS, WAS THERE A TIME WHEN YOU DID NOT HAVE A STEADY PLACE TO SLEEP OR SLEPT IN A SHELTER (INCLUDING NOW)?: NO

## 2023-09-07 SDOH — ECONOMIC STABILITY: FOOD INSECURITY: WITHIN THE PAST 12 MONTHS, YOU WORRIED THAT YOUR FOOD WOULD RUN OUT BEFORE YOU GOT MONEY TO BUY MORE.: NEVER TRUE

## 2023-09-07 ASSESSMENT — PATIENT HEALTH QUESTIONNAIRE - PHQ9
SUM OF ALL RESPONSES TO PHQ QUESTIONS 1-9: 0
1. LITTLE INTEREST OR PLEASURE IN DOING THINGS: 0
2. FEELING DOWN, DEPRESSED OR HOPELESS: 0
SUM OF ALL RESPONSES TO PHQ9 QUESTIONS 1 & 2: 0

## 2023-09-07 ASSESSMENT — LIFESTYLE VARIABLES
HOW MANY STANDARD DRINKS CONTAINING ALCOHOL DO YOU HAVE ON A TYPICAL DAY: 1 OR 2
HOW OFTEN DO YOU HAVE A DRINK CONTAINING ALCOHOL: 2-4 TIMES A MONTH

## 2023-09-07 NOTE — PROGRESS NOTES
Medicare Annual Wellness Visit    J Carlos Stovall is here for Medicare AWV    Assessment & Plan   Medicare annual wellness visit, subsequent  Essential (primary) hypertension-blood pressure well controlled on lifestyle changes. Mixed hyperlipidemia-LDL goal of 100. Check labs to be sure that is achieved. -     Comprehensive Metabolic Panel; Future  -     CBC with Auto Differential; Future  -     Lipid Panel; Future  -     TSH; Future  Gastroesophageal reflux disease, unspecified whether esophagitis present-some history of reflux and stricture. Discussed having another endoscopy and she will consider. Esophageal stricture  Urinary tract infection without hematuria, site unspecified-some occasional urinary incontinence. This seems to have increased so we will check for infection.  -     Culture, Urine; Future  -     Urinalysis with Microscopic; Future  Need for hepatitis C screening test  -     Hepatitis C Antibody; Future    Recommendations for Preventive Services Due: see orders and patient instructions/AVS.  Recommended screening schedule for the next 5-10 years is provided to the patient in written form: see Patient Instructions/AVS.     Return in 1 year (on 9/7/2024). Subjective   Presents for her wellness exam as well as a follow-up. Has overall been feeling well. She walks for exercise daily. She does have some issues with urinary incontinence at times. She is continuing to get her mammograms at [facility] and has had dense breast there. She also wanted to discussed vaccinations in which when she should get. She is already gotten her RSV vaccine done. Patient's complete Health Risk Assessment and screening values have been reviewed and are found in Flowsheets. The following problems were reviewed today and where indicated follow up appointments were made and/or referrals ordered.     Positive Risk Factor Screenings with Interventions:                 Weight and Activity:  Physical Activity: Sufficiently

## 2023-09-08 LAB
APPEARANCE UR: ABNORMAL
BACTERIA SPEC CULT: NORMAL
BACTERIA URNS QL MICRO: ABNORMAL /HPF
BASOPHILS # BLD: 0 K/UL (ref 0–0.1)
BASOPHILS NFR BLD: 0 % (ref 0–1)
BILIRUB UR QL: NEGATIVE
COLOR UR: ABNORMAL
DIFFERENTIAL METHOD BLD: ABNORMAL
EOSINOPHIL # BLD: 0.1 K/UL (ref 0–0.4)
EOSINOPHIL NFR BLD: 2 % (ref 0–7)
EPITH CASTS URNS QL MICRO: ABNORMAL /LPF
ERYTHROCYTE [DISTWIDTH] IN BLOOD BY AUTOMATED COUNT: 13.7 % (ref 11.5–14.5)
GLUCOSE UR STRIP.AUTO-MCNC: NEGATIVE MG/DL
HCT VFR BLD AUTO: 48.6 % (ref 35–47)
HCV AB SER IA-ACNC: 0.08 INDEX
HCV AB SERPL QL IA: NONREACTIVE
HGB BLD-MCNC: 14.9 G/DL (ref 11.5–16)
HGB UR QL STRIP: NEGATIVE
HYALINE CASTS URNS QL MICRO: ABNORMAL /LPF (ref 0–5)
IMM GRANULOCYTES # BLD AUTO: 0 K/UL (ref 0–0.04)
IMM GRANULOCYTES NFR BLD AUTO: 0 % (ref 0–0.5)
KETONES UR QL STRIP.AUTO: 15 MG/DL
LEUKOCYTE ESTERASE UR QL STRIP.AUTO: ABNORMAL
LYMPHOCYTES # BLD: 1.2 K/UL (ref 0.8–3.5)
LYMPHOCYTES NFR BLD: 25 % (ref 12–49)
MCH RBC QN AUTO: 28.2 PG (ref 26–34)
MCHC RBC AUTO-ENTMCNC: 30.7 G/DL (ref 30–36.5)
MCV RBC AUTO: 91.9 FL (ref 80–99)
MONOCYTES # BLD: 0.4 K/UL (ref 0–1)
MONOCYTES NFR BLD: 9 % (ref 5–13)
NEUTS SEG # BLD: 3.1 K/UL (ref 1.8–8)
NEUTS SEG NFR BLD: 64 % (ref 32–75)
NITRITE UR QL STRIP.AUTO: NEGATIVE
NRBC # BLD: 0 K/UL (ref 0–0.01)
NRBC BLD-RTO: 0 PER 100 WBC
PH UR STRIP: 5 (ref 5–8)
PLATELET # BLD AUTO: 216 K/UL (ref 150–400)
PMV BLD AUTO: 11.1 FL (ref 8.9–12.9)
PROT UR STRIP-MCNC: NEGATIVE MG/DL
RBC # BLD AUTO: 5.29 M/UL (ref 3.8–5.2)
RBC #/AREA URNS HPF: ABNORMAL /HPF (ref 0–5)
SERVICE CMNT-IMP: NORMAL
SP GR UR REFRACTOMETRY: 1.02 (ref 1–1.03)
UROBILINOGEN UR QL STRIP.AUTO: 0.2 EU/DL (ref 0.2–1)
WBC # BLD AUTO: 4.9 K/UL (ref 3.6–11)
WBC URNS QL MICRO: ABNORMAL /HPF (ref 0–4)

## 2023-09-11 RX ORDER — DOXYCYCLINE HYCLATE 20 MG
TABLET ORAL
Qty: 60 TABLET | Refills: 1 | Status: SHIPPED | OUTPATIENT
Start: 2023-09-11

## 2023-11-09 RX ORDER — ATORVASTATIN CALCIUM 20 MG/1
TABLET, FILM COATED ORAL
Qty: 45 TABLET | Refills: 3 | Status: SHIPPED | OUTPATIENT
Start: 2023-11-09

## 2023-11-09 RX ORDER — DOXYCYCLINE HYCLATE 20 MG
TABLET ORAL
Qty: 60 TABLET | Refills: 1 | Status: SHIPPED | OUTPATIENT
Start: 2023-11-09

## 2024-01-03 ENCOUNTER — TELEPHONE (OUTPATIENT)
Age: 79
End: 2024-01-03

## 2024-01-03 ENCOUNTER — OFFICE VISIT (OUTPATIENT)
Age: 79
End: 2024-01-03
Payer: MEDICARE

## 2024-01-03 ENCOUNTER — HOSPITAL ENCOUNTER (OUTPATIENT)
Age: 79
Discharge: HOME | End: 2024-01-06
Payer: MEDICARE

## 2024-01-03 VITALS
BODY MASS INDEX: 30.9 KG/M2 | SYSTOLIC BLOOD PRESSURE: 119 MMHG | DIASTOLIC BLOOD PRESSURE: 66 MMHG | HEIGHT: 63 IN | WEIGHT: 174.4 LBS | RESPIRATION RATE: 15 BRPM | OXYGEN SATURATION: 96 % | TEMPERATURE: 98 F | HEART RATE: 81 BPM

## 2024-01-03 DIAGNOSIS — M79.605 ACUTE LEG PAIN, LEFT: Primary | ICD-10-CM

## 2024-01-03 DIAGNOSIS — I72.4: ICD-10-CM

## 2024-01-03 DIAGNOSIS — M79.605 ACUTE LEG PAIN, LEFT: ICD-10-CM

## 2024-01-03 LAB — ECHO BSA: 1.88 M2

## 2024-01-03 PROCEDURE — G8427 DOCREV CUR MEDS BY ELIG CLIN: HCPCS | Performed by: INTERNAL MEDICINE

## 2024-01-03 PROCEDURE — G8417 CALC BMI ABV UP PARAM F/U: HCPCS | Performed by: INTERNAL MEDICINE

## 2024-01-03 PROCEDURE — 1090F PRES/ABSN URINE INCON ASSESS: CPT | Performed by: INTERNAL MEDICINE

## 2024-01-03 PROCEDURE — 1036F TOBACCO NON-USER: CPT | Performed by: INTERNAL MEDICINE

## 2024-01-03 PROCEDURE — G8400 PT W/DXA NO RESULTS DOC: HCPCS | Performed by: INTERNAL MEDICINE

## 2024-01-03 PROCEDURE — 99213 OFFICE O/P EST LOW 20 MIN: CPT | Performed by: INTERNAL MEDICINE

## 2024-01-03 PROCEDURE — 93971 EXTREMITY STUDY: CPT

## 2024-01-03 PROCEDURE — G8484 FLU IMMUNIZE NO ADMIN: HCPCS | Performed by: INTERNAL MEDICINE

## 2024-01-03 PROCEDURE — 1123F ACP DISCUSS/DSCN MKR DOCD: CPT | Performed by: INTERNAL MEDICINE

## 2024-01-03 RX ORDER — CEPHALEXIN 500 MG/1
500 CAPSULE ORAL 3 TIMES DAILY
Qty: 21 CAPSULE | Refills: 0 | Status: SHIPPED | OUTPATIENT
Start: 2024-01-03 | End: 2024-01-10

## 2024-01-03 RX ORDER — ASPIRIN 81 MG/1
162 TABLET ORAL 3 TIMES DAILY
Qty: 90 TABLET | Refills: 1 | COMMUNITY
Start: 2024-01-03

## 2024-01-03 ASSESSMENT — PATIENT HEALTH QUESTIONNAIRE - PHQ9
SUM OF ALL RESPONSES TO PHQ9 QUESTIONS 1 & 2: 0
SUM OF ALL RESPONSES TO PHQ QUESTIONS 1-9: 0
2. FEELING DOWN, DEPRESSED OR HOPELESS: 0
1. LITTLE INTEREST OR PLEASURE IN DOING THINGS: 0

## 2024-01-03 NOTE — PROGRESS NOTES
HPI:  Aida Louis is a 78 y.o. year old female who is here for a lump on her left lower leg that has been present for the last week or more.  It has been tender to the touch and also uncomfortable to try to sleep at night.  No fevers or chills.  No injury that she is aware of.  No numbness, tingling, or weakness.  No night sweats.      Past Medical History:   Diagnosis Date    GERD (gastroesophageal reflux disease)     Mixed hyperlipidemia 2/28/2020    OAB (overactive bladder) 2/28/2020       Past Surgical History:   Procedure Laterality Date    CATARACT REMOVAL Bilateral     CHOLECYSTECTOMY      COLONOSCOPY N/A 9/7/2018    COLONOSCOPY performed by Satnam Snow MD at [facility] ENDOSCOPY    COLONOSCOPY N/A 10/29/2021    COLONOSCOPY   :- performed by Satnam Snow MD at [facility] ENDOSCOPY    GI      surgery for rectal fissure    GI      colonoscopy - colon polyp    TONSILLECTOMY         Prior to Admission medications    Medication Sig Start Date End Date Taking? Authorizing Provider   atorvastatin (LIPITOR) 20 MG tablet TAKE 1/2 TABLET BY MOUTH DAILY 11/9/23  Yes Ruben Orosco MD   doxycycline hyclate (PERIOSTAT) 20 MG tablet TAKE 2 TABLETS BY MOUTH TWICE DAILY 11/9/23  Yes Ruben Orosco MD   aspirin 81 MG chewable tablet Take 1 tablet by mouth daily   Yes Automatic Reconciliation, Ar   vitamin D 25 MCG (1000 UT) CAPS Take 1 capsule by mouth daily  Patient not taking: Reported on 1/3/2024    Automatic Reconciliation, Ar       Social History     Socioeconomic History    Marital status:      Spouse name: Not on file    Number of children: Not on file    Years of education: Not on file    Highest education level: Not on file   Occupational History    Not on file   Tobacco Use    Smoking status: Never     Passive exposure: Never    Smokeless tobacco: Never   Vaping Use    Vaping Use: Never used   Substance and Sexual Activity    Alcohol use: Yes     Alcohol/week: 2.0 - 3.0 standard drinks of alcohol    Drug

## 2024-01-03 NOTE — TELEPHONE ENCOUNTER
Patient's , Chris, called.  Patient asked Dr. Orosco to call her  later on today to discuss her visit today.  She thinks she may have given Dr. Orosco the incorrect phone number.    Chris - [phone] - Cell                 [phone] - [place]

## 2024-01-09 RX ORDER — CEPHALEXIN 500 MG/1
CAPSULE ORAL
Qty: 21 CAPSULE | Refills: 0 | OUTPATIENT
Start: 2024-01-09

## 2024-01-09 RX ORDER — DOXYCYCLINE HYCLATE 20 MG
TABLET ORAL
Qty: 60 TABLET | Refills: 1 | Status: SHIPPED | OUTPATIENT
Start: 2024-01-09

## 2024-01-11 ENCOUNTER — OFFICE VISIT (OUTPATIENT)
Age: 79
End: 2024-01-11
Payer: MEDICARE

## 2024-01-11 VITALS
BODY MASS INDEX: 30.65 KG/M2 | TEMPERATURE: 98 F | DIASTOLIC BLOOD PRESSURE: 86 MMHG | RESPIRATION RATE: 15 BRPM | HEART RATE: 78 BPM | HEIGHT: 63 IN | OXYGEN SATURATION: 100 % | WEIGHT: 173 LBS | SYSTOLIC BLOOD PRESSURE: 126 MMHG

## 2024-01-11 DIAGNOSIS — I72.4: ICD-10-CM

## 2024-01-11 DIAGNOSIS — I80.02 SUPERFICIAL PHLEBITIS AND THROMBOPHLEBITIS OF LEFT LEG: Primary | ICD-10-CM

## 2024-01-11 DIAGNOSIS — M79.605 ACUTE LEG PAIN, LEFT: ICD-10-CM

## 2024-01-11 PROCEDURE — G8484 FLU IMMUNIZE NO ADMIN: HCPCS | Performed by: INTERNAL MEDICINE

## 2024-01-11 PROCEDURE — 1036F TOBACCO NON-USER: CPT | Performed by: INTERNAL MEDICINE

## 2024-01-11 PROCEDURE — 1123F ACP DISCUSS/DSCN MKR DOCD: CPT | Performed by: INTERNAL MEDICINE

## 2024-01-11 PROCEDURE — G8417 CALC BMI ABV UP PARAM F/U: HCPCS | Performed by: INTERNAL MEDICINE

## 2024-01-11 PROCEDURE — 99213 OFFICE O/P EST LOW 20 MIN: CPT | Performed by: INTERNAL MEDICINE

## 2024-01-11 PROCEDURE — G8400 PT W/DXA NO RESULTS DOC: HCPCS | Performed by: INTERNAL MEDICINE

## 2024-01-11 PROCEDURE — 1090F PRES/ABSN URINE INCON ASSESS: CPT | Performed by: INTERNAL MEDICINE

## 2024-01-11 PROCEDURE — G8427 DOCREV CUR MEDS BY ELIG CLIN: HCPCS | Performed by: INTERNAL MEDICINE

## 2024-01-11 RX ORDER — ASPIRIN 81 MG/1
162 TABLET ORAL DAILY
Qty: 90 TABLET | Refills: 1 | Status: SHIPPED | COMMUNITY
Start: 2024-01-11

## 2024-01-12 NOTE — PROGRESS NOTES
HPI:  Aida Louis is a 78 y.o. year old female who is here for follow-up of her superficial phlebitis of the left leg.  She has finished her antibiotics and has been taking aspirin.  Some of the soreness has improved.  She has noted a slight nodularity at just below her left knee.  No fevers or chills.  She is walking without difficulty.  No significant swelling.  She has an appointment on Monday to see the vascular surgeon.  She did stop using warm compresses.      Past Medical History:   Diagnosis Date    GERD (gastroesophageal reflux disease)     Mixed hyperlipidemia 2/28/2020    OAB (overactive bladder) 2/28/2020       Past Surgical History:   Procedure Laterality Date    CATARACT REMOVAL Bilateral     CHOLECYSTECTOMY      COLONOSCOPY N/A 9/7/2018    COLONOSCOPY performed by Satnam Snow MD at [facility] ENDOSCOPY    COLONOSCOPY N/A 10/29/2021    COLONOSCOPY   :- performed by Satnam Snow MD at [facility] ENDOSCOPY    GI      surgery for rectal fissure    GI      colonoscopy - colon polyp    TONSILLECTOMY         Prior to Admission medications    Medication Sig Start Date End Date Taking? Authorizing Provider   aspirin 81 MG EC tablet Take 2 tablets by mouth daily 1/11/24  Yes Ruben Orosco MD   doxycycline hyclate (PERIOSTAT) 20 MG tablet TAKE 2 TABLETS BY MOUTH TWICE DAILY 1/9/24  Yes Ruben Orosco MD   atorvastatin (LIPITOR) 20 MG tablet TAKE 1/2 TABLET BY MOUTH DAILY 11/9/23  Yes Ruben Orosco MD   vitamin D 25 MCG (1000 UT) CAPS Take 1 capsule by mouth daily   Yes Automatic Reconciliation, Ar       Social History     Socioeconomic History    Marital status:      Spouse name: Not on file    Number of children: Not on file    Years of education: Not on file    Highest education level: Not on file   Occupational History    Not on file   Tobacco Use    Smoking status: Never     Passive exposure: Never    Smokeless tobacco: Never   Vaping Use    Vaping Use: Never used   Substance and Sexual

## 2024-03-12 DIAGNOSIS — E55.9 VITAMIN D DEFICIENCY: ICD-10-CM

## 2024-03-12 DIAGNOSIS — R41.3 MEMORY LOSS: ICD-10-CM

## 2024-03-12 DIAGNOSIS — R41.3 MEMORY LOSS: Primary | ICD-10-CM

## 2024-03-13 LAB
25(OH)D3 SERPL-MCNC: 30.5 NG/ML (ref 30–100)
VIT B12 SERPL-MCNC: 220 PG/ML (ref 193–986)

## 2024-03-15 RX ORDER — DOXYCYCLINE HYCLATE 20 MG
TABLET ORAL
Qty: 60 TABLET | Refills: 1 | Status: SHIPPED | OUTPATIENT
Start: 2024-03-15

## 2024-05-13 RX ORDER — DOXYCYCLINE HYCLATE 20 MG
TABLET ORAL
Qty: 60 TABLET | Refills: 1 | Status: SHIPPED | OUTPATIENT
Start: 2024-05-13

## 2024-06-24 ENCOUNTER — TELEPHONE (OUTPATIENT)
Age: 79
End: 2024-06-24

## 2024-06-24 NOTE — TELEPHONE ENCOUNTER
Patient's  requesting testing/evaluation for wife,   [facility] MRI. revealed mild brain tissue loss.    hospitals call [phone]

## 2024-07-09 DIAGNOSIS — R93.89 ABNORMAL MRI: ICD-10-CM

## 2024-07-09 DIAGNOSIS — R41.3 MEMORY LOSS: Primary | ICD-10-CM

## 2024-07-09 NOTE — PROGRESS NOTES
Referral Details       Referred By   Referred To    Rbuen Orosco MD   [address] [address]   Phone: [phone]   Fax: [phone]    Diagnoses: Memory loss   Abnormal MRI   Order: [facility] - Tona Monsivais Psyd, Neuropsychology [place] (Janina Escudero)   Reason: Specialty Services Required    Tona Monsivais PSYD

## 2024-07-17 RX ORDER — DOXYCYCLINE HYCLATE 20 MG
TABLET ORAL
Qty: 60 TABLET | Refills: 1 | Status: SHIPPED | OUTPATIENT
Start: 2024-07-17

## 2024-07-17 NOTE — TELEPHONE ENCOUNTER
Chief Complaint   Patient presents with    Medication Refill     Last Appointment with Dr. Ruben Orosco:  1/11/2024   Future Appointments   Date Time Provider Department Center   8/16/2024 11:00 AM Tona Monsivais PSYD [facility] AMB   8/27/2024  2:00 PM NEUROPSYCH TESTING [facility] AMB   10/1/2024  2:00 PM Tona Monsivais PSYD [facility] AM[facility]   4/1/2025  1:00 PM [facility]

## 2024-08-05 ENCOUNTER — CLINICAL DOCUMENTATION (OUTPATIENT)
Age: 79
End: 2024-08-05

## 2024-08-05 ENCOUNTER — TELEPHONE (OUTPATIENT)
Age: 79
End: 2024-08-05

## 2024-08-05 NOTE — TELEPHONE ENCOUNTER
Provider has the report.  Called and left a message letting them know the provider has the report.

## 2024-08-05 NOTE — TELEPHONE ENCOUNTER
Patient's  wants to be certain we have rec'd wife's Brain scan done @ [facility].    If we have, no need to call   .    Questions pls call [phone]

## 2024-08-15 NOTE — PROGRESS NOTES
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 Ms. Louis has been  to her  for 55 years and they share two daughters. She described an adequate social support system.     MENTAL STATUS     Arrival: On time and accompanied by her .   General appearance: Appropriately dressed and well-groomed.   Level of consciousness: Alert, attentive.   Ambulation/Gait: Ambulated independently; gait was unremarkable.   Motor: Some dizziness upon standing; she was unsteady after rising from her seat. No observed tremors.   Sensory: Vision and hearing were adequate for the purposes of the evaluation.  Speech: Normal for rate, tone, and prosody. Mild word finding difficulties. She did not always present information in a logical/contextual manner, did not not explain concepts with clear/elaborate details.   Language comprehension: Intact in conversation, but problematic with instructions which needed to be repeated or rephrased until she was able to understand it.   Thought processes/Insight: Convoluted thought processes; lacked insight into her perceived difficulties.   Mood and Affect: Euthymic mood; normal range of affect. Expressed hesitance toward the testing process and wondered why this was necessary given that she was not concerned about her cognitive functioning.    Current suicidality/homicidality: Did not endorse.   Rapport: Adequately established.     ASSESSMENT & PLAN     Ms. Aida Louis is a 79 y.o., right-handed, , White (non-) female with a master's degree who presented with a lack of insight into her perceived difficulties. Her  has noticed a progressive worsening of short-term memory functions. He has started to take over certain complex ADLs such as financial management and cooking as a result of her cognitive difficulties. Recent neuroimaging from [facility] showed atrophy in the bilateral

## 2024-08-16 ENCOUNTER — OFFICE VISIT (OUTPATIENT)
Age: 79
End: 2024-08-16
Payer: MEDICARE

## 2024-08-16 DIAGNOSIS — R41.89 COGNITIVE IMPAIRMENT: Primary | ICD-10-CM

## 2024-08-16 PROCEDURE — 1123F ACP DISCUSS/DSCN MKR DOCD: CPT | Performed by: STUDENT IN AN ORGANIZED HEALTH CARE EDUCATION/TRAINING PROGRAM

## 2024-08-16 PROCEDURE — 90791 PSYCH DIAGNOSTIC EVALUATION: CPT | Performed by: STUDENT IN AN ORGANIZED HEALTH CARE EDUCATION/TRAINING PROGRAM

## 2024-08-16 NOTE — PATIENT INSTRUCTIONS
Thank you for choosing us for your neuropsychological evaluation today. This evaluation will examine your overall cognitive functioning, including areas such as memory, attention, concentration, language, and problem solving. You were seen for an intake appointment by Dr. Tona Monsivais (Neuropsychologist) today. You will return for testing and feedback of the results on the following dates:    Date of testing appointment: 8/27/2024 at 2:00 pm.   If you wear glasses/contacts and/or hearing assistive devices, please be sure to bring them with you to your testing appointment.   You will be working with my Psychometrist, Linda, on the day of testing. She is specially trained to administer these cognitive tests to you.   Please prepare accordingly, as the duration of testing may take a few hours to complete.     Date of follow-up / feedback: 10/1/2024 at 2:00 pm via in-person.    To reschedule or cancel your appointment, please call [phone].   In case of an emergency, call [phone] or report to the nearest emergency department.  It has been our pleasure to work with you, and we look forward to speaking with you soon.

## 2024-08-23 ENCOUNTER — PROCEDURE VISIT (OUTPATIENT)
Age: 79
End: 2024-08-23
Payer: MEDICARE

## 2024-08-23 DIAGNOSIS — F03.A0 MILD DEMENTIA, UNSPECIFIED DEMENTIA TYPE, UNSPECIFIED WHETHER BEHAVIORAL, PSYCHOTIC, OR MOOD DISTURBANCE OR ANXIETY (HCC): Primary | ICD-10-CM

## 2024-08-23 PROCEDURE — 96133 NRPSYC TST EVAL PHYS/QHP EA: CPT | Performed by: STUDENT IN AN ORGANIZED HEALTH CARE EDUCATION/TRAINING PROGRAM

## 2024-08-23 PROCEDURE — 96132 NRPSYC TST EVAL PHYS/QHP 1ST: CPT | Performed by: STUDENT IN AN ORGANIZED HEALTH CARE EDUCATION/TRAINING PROGRAM

## 2024-08-23 PROCEDURE — 96138 PSYCL/NRPSYC TECH 1ST: CPT | Performed by: STUDENT IN AN ORGANIZED HEALTH CARE EDUCATION/TRAINING PROGRAM

## 2024-08-23 PROCEDURE — 96139 PSYCL/NRPSYC TST TECH EA: CPT | Performed by: STUDENT IN AN ORGANIZED HEALTH CARE EDUCATION/TRAINING PROGRAM

## 2024-08-23 PROCEDURE — 96136 PSYCL/NRPSYC TST PHY/QHP 1ST: CPT | Performed by: STUDENT IN AN ORGANIZED HEALTH CARE EDUCATION/TRAINING PROGRAM

## 2024-08-27 NOTE — PROGRESS NOTES
NEUROPSYCHOLOGICAL EVALUATION REPORT  Confidential     Name:  Aida Louis MRN:  [id number]   Age:  79 y.o. YOB: 1945   Gender:  female Date of Intake:  2024   Race/Ethnicity:  White (non-) Date of Testin2024    Education:  Master's degree     Handedness:  Right Referral Source:  Ruben Orosco MD (PCP)     EVALUATION METHODS: The following information was gathered from a clinical interview on 2024 with Ms. Louis, her , Mr. Chris Louis, and a review of available medical records. Information was also gathered from the administered neuropsychological tests that were completed in-person. This evaluation was conducted for clinical treatment planning and may not be valid for other purposes. Potential risks and benefits, limits of confidentiality, and evaluation procedures were discussed. Ms. Louis consented to have this report made available to her treating providers through her electronic medical records. She expressed an understanding of the purpose of the visit and consented to the procedures.     REASON FOR REFERRAL: Ms. Louis was referred for neuropsychological evaluation by her primary care provider (PCP) to evaluate cognitive functioning and rule out impairment in the context of memory loss and abnormal MRI. She was seen in a Memory Disorders Clinic at [facility] in 2024 and scored 22/30 on a brief measure of mental status (MoCA). Per impressions, patient did not meet criteria for dementia because she was independent for ADLs. This is her first neuropsychological evaluation.    PRESENTING CONCERNS     Cognitive Functioning: Ms. Louis reported that she has not noticed any changes to her cognitive functioning nor is she concerned about her cognitive health in any way. Her , however, has noticed a steady decline of short-term memory functions. Per prior notes, she has difficulties with orientation to time and is repetitious in  2025.     Advanced Care Planning/Safety.   Ms. Louis's low functioning score on a test of practical judgment and reasoning suggests that she has limited decision-making capacity for major medical decisions. While it is important to include Ms. Louis in these decisions, it is likely no longer safe for her to make them alone. As such, it will be important to determine whether power of  has been established, as this agent would be able to make health-related decisions on her behalf with the goal of supporting her care.   Ms. Louis's family should continue to provide oversight for complex ADLs, especially those tasks for which safety could be a risk.   Although Ms. Louis or her  denied any problems with driving abilities, her lowered scores on a test of mental flexibility as well as her memory impairments raise concern about her ability to drive safely. According to the [facility]'s cognitive impairment policy, \"...drivers with a diagnosis of cognitive impairment of any severity that may affect their ability to drive or a diagnosis of dementia of any type must successfully pass the [facility] knowledge tests and then the BPT skills test.\" Given this policy, a driving evaluation is recommended. More information can be found through the following website: https://www.[facility].[place].gov/licenses-ids/license/medical/cognitive and a MED-2 form (https://www.[facility].[place].gov/sites/default/files/forms/med2.pdf) would need to be completed to pursue this evaluation.   Ms. Louis and her family should continue to concretely discuss her care wishes in anticipation of future decline, especially if increased supervision becomes necessary.   Ms. Louis's family and health providers should monitor for changes that suggest functional decline, significant mood/behavioral changes, new safety risks, and/or the need for a change in treatment or increased support.     Lifestyle Management. The following strategies may be beneficial in

## 2024-09-05 ENCOUNTER — OFFICE VISIT (OUTPATIENT)
Age: 79
End: 2024-09-05
Payer: MEDICARE

## 2024-09-05 VITALS
SYSTOLIC BLOOD PRESSURE: 140 MMHG | BODY MASS INDEX: 27.8 KG/M2 | HEART RATE: 79 BPM | HEIGHT: 66 IN | WEIGHT: 173 LBS | OXYGEN SATURATION: 96 % | DIASTOLIC BLOOD PRESSURE: 90 MMHG

## 2024-09-05 DIAGNOSIS — F03.A0 MILD DEMENTIA, UNSPECIFIED DEMENTIA TYPE, UNSPECIFIED WHETHER BEHAVIORAL, PSYCHOTIC, OR MOOD DISTURBANCE OR ANXIETY (HCC): Primary | ICD-10-CM

## 2024-09-05 PROCEDURE — G8417 CALC BMI ABV UP PARAM F/U: HCPCS | Performed by: PSYCHIATRY & NEUROLOGY

## 2024-09-05 PROCEDURE — G8400 PT W/DXA NO RESULTS DOC: HCPCS | Performed by: PSYCHIATRY & NEUROLOGY

## 2024-09-05 PROCEDURE — G8427 DOCREV CUR MEDS BY ELIG CLIN: HCPCS | Performed by: PSYCHIATRY & NEUROLOGY

## 2024-09-05 PROCEDURE — 99205 OFFICE O/P NEW HI 60 MIN: CPT | Performed by: PSYCHIATRY & NEUROLOGY

## 2024-09-05 PROCEDURE — 1123F ACP DISCUSS/DSCN MKR DOCD: CPT | Performed by: PSYCHIATRY & NEUROLOGY

## 2024-09-05 PROCEDURE — 1090F PRES/ABSN URINE INCON ASSESS: CPT | Performed by: PSYCHIATRY & NEUROLOGY

## 2024-09-05 PROCEDURE — 1036F TOBACCO NON-USER: CPT | Performed by: PSYCHIATRY & NEUROLOGY

## 2024-09-05 RX ORDER — DONEPEZIL HYDROCHLORIDE 10 MG/1
TABLET, FILM COATED ORAL
Qty: 90 TABLET | Refills: 1 | Status: SHIPPED | OUTPATIENT
Start: 2024-09-05

## 2024-09-05 ASSESSMENT — PATIENT HEALTH QUESTIONNAIRE - PHQ9
SUM OF ALL RESPONSES TO PHQ9 QUESTIONS 1 & 2: 0
SUM OF ALL RESPONSES TO PHQ QUESTIONS 1-9: 0
SUM OF ALL RESPONSES TO PHQ QUESTIONS 1-9: 0
1. LITTLE INTEREST OR PLEASURE IN DOING THINGS: NOT AT ALL
SUM OF ALL RESPONSES TO PHQ QUESTIONS 1-9: 0
2. FEELING DOWN, DEPRESSED OR HOPELESS: NOT AT ALL
SUM OF ALL RESPONSES TO PHQ QUESTIONS 1-9: 0

## 2024-09-05 NOTE — PROGRESS NOTES
NEUROLOGY  NEW PATIENT EVALUATION/CONSULTATION       PATIENT NAME: Aida Louis    MRN: [id number]    REASON FOR CONSULTATION: Memory impairment    09/05/24      Previous records (physician notes, laboratory reports, and radiology reports) and imaging studies were reviewed and summarized. My recommendations will be communicated back to the patient's physician(s) via electronic medical record and/or by US mail. The patient was accompanied by her .      HISTORY OF PRESENT ILLNESS:  Aida Louis is a 79 y.o.  female presenting for evaluation of memory impairment.      Onset and progression: 2 years, progressive    Neuropsychiatric symptoms    By Family members account:    Problems with judgment:No   Reduced interest in hobbies/activities: No   Repeats questions, stories, or statements: Yes    Trouble recalling people's names: Yes   Trouble learning how to use a tool or appliance: No   Forgetting the correct month or year: Yes   Difficulty handling financial affairs (bill-paying, taxes): No   Difficulty remembering appointments:No    Memory: Short term memory deficits, issues with orientation to dates  Language: Denies issues with word finding   Change in personality:None  Socially inappropriate behavior:None  Change in eating habits: None  Physical changes: None  Depressive symptoms: None  Hallucinations/Delusions: None    Ability to function:  Driving: Yes, no issues with navigation or MVA  Finances:Handles with assistance from her   Cooking:Yes, occasionally, no concerns  Manages own medication: Yes, no issues reported  Residing: with her     Prior work-up:  Neuropsychologic testing (Dr. Monsivais) 8/23/24 c/w mild dementia, possible Alzheimer's disease  MRI Brain 5/7/24  \"Moderate generalized atrophy of the brain with asymmetric   preferential involvement of the bilateral temporal lobes\"     Prior treatments: None      PAST MEDICAL HISTORY:  Past Medical History:   Diagnosis Date    GERD

## 2024-09-06 ENCOUNTER — OFFICE VISIT (OUTPATIENT)
Age: 79
End: 2024-09-06
Payer: MEDICARE

## 2024-09-06 DIAGNOSIS — G30.9 MILD ALZHEIMER'S DEMENTIA, UNSPECIFIED TIMING OF DEMENTIA ONSET, UNSPECIFIED WHETHER BEHAVIORAL, PSYCHOTIC, OR MOOD DISTURBANCE OR ANXIETY (HCC): Primary | ICD-10-CM

## 2024-09-06 DIAGNOSIS — F02.A0 MILD ALZHEIMER'S DEMENTIA, UNSPECIFIED TIMING OF DEMENTIA ONSET, UNSPECIFIED WHETHER BEHAVIORAL, PSYCHOTIC, OR MOOD DISTURBANCE OR ANXIETY (HCC): Primary | ICD-10-CM

## 2024-09-06 PROCEDURE — 96132 NRPSYC TST EVAL PHYS/QHP 1ST: CPT | Performed by: STUDENT IN AN ORGANIZED HEALTH CARE EDUCATION/TRAINING PROGRAM

## 2024-09-06 NOTE — PROGRESS NOTES
NEUROPSYCHOLOGICAL EVALUATION   Feedback     Prior to seeing the patient for today's visit, I reviewed pertinent records, including the previously completed report, the records in Epic, and any updated visits from other providers since the patient's last visit. Ms. Aida Louis was seen for a feedback appointment via in-person to discuss the results of her neuropsychological evaluation. Her , Mr. Louis was also present.     DISCUSSION OF RESULTS AND RECOMMENDATIONS:   They stated that they had seen Dr. Craig the day before who provided her insight that this was looking to be consistent with Alzheimer's disease. I expressed agreement with her insight, noting that Ms. Louis's neuropsychological testing results revealed a primary pattern of amnestic impairment. As such, I explained how her cognitive impairments in conjunction with increased dependence for certain ADLs suggested that she meets criteria for mild dementia, likely due to Alzheimer's disease, especially when also considering bilateral temporal lobe atrophy seen on imaging. Ms. Louis and her  expressed understanding with the results and that the findings were consistent with what they notice in daily life. Mr. Louis noted that she will start donepezil tonight and they will need to schedule for a PET Amyloid scan. Ms. Louis was repetitious with her questions and at times was not fully aware of her reason for having undergone all of these workups. She was reassured after I explained how identifying what was going on with regard to her cognitive health was better than letting this progress at a faster rate if she went untreated. I also spent time discussing her cognitive strengths gleaned from testing due to her cognitive reserve.     Recommendations from the report were discussed in detail.   Start donepezil as indicated by her neurologist.   At this time, Mr. Louis did not feel that he was concerned with her driving and felt that a

## 2024-09-09 ENCOUNTER — TELEPHONE (OUTPATIENT)
Age: 79
End: 2024-09-09

## 2024-09-09 NOTE — TELEPHONE ENCOUNTER
[facility] is calling to state the diagnosis for the patient's PET Brain Amyloid Imaging is not covered under her insurance.    Should the doctor wish her to have this scan a different diagnosis that is applicable will need to be submitted via a new order.

## 2024-09-10 ENCOUNTER — PATIENT MESSAGE (OUTPATIENT)
Age: 79
End: 2024-09-10

## 2024-09-16 RX ORDER — DOXYCYCLINE HYCLATE 20 MG
TABLET ORAL
Qty: 60 TABLET | Refills: 1 | Status: SHIPPED | OUTPATIENT
Start: 2024-09-16

## 2024-09-18 ENCOUNTER — TELEPHONE (OUTPATIENT)
Age: 79
End: 2024-09-18

## 2024-09-26 DIAGNOSIS — F03.A0 MILD DEMENTIA, UNSPECIFIED DEMENTIA TYPE, UNSPECIFIED WHETHER BEHAVIORAL, PSYCHOTIC, OR MOOD DISTURBANCE OR ANXIETY (HCC): Primary | ICD-10-CM

## 2024-09-30 ENCOUNTER — HOSPITAL ENCOUNTER (OUTPATIENT)
Facility: HOSPITAL | Age: 79
Discharge: HOME | End: 2024-10-03
Attending: PSYCHIATRY & NEUROLOGY
Payer: MEDICARE

## 2024-09-30 DIAGNOSIS — F03.A0 MILD DEMENTIA, UNSPECIFIED DEMENTIA TYPE, UNSPECIFIED WHETHER BEHAVIORAL, PSYCHOTIC, OR MOOD DISTURBANCE OR ANXIETY (HCC): ICD-10-CM

## 2024-09-30 LAB
APPEARANCE CSF: CLEAR
COLOR CSF: COLORLESS
GLUCOSE CSF-MCNC: 62 MG/DL (ref 40–70)
PROT CSF-MCNC: 34 MG/DL (ref 15–45)
RBC # CSF: 18 /CU MM
TUBE # CSF: 1
TUBE # CSF: 1
TUBE # CSF: 3
WBC # CSF: 1 /CU MM (ref 0–5)

## 2024-09-30 PROCEDURE — 84157 ASSAY OF PROTEIN OTHER: CPT

## 2024-09-30 PROCEDURE — 89050 BODY FLUID CELL COUNT: CPT

## 2024-09-30 PROCEDURE — 87205 SMEAR GRAM STAIN: CPT

## 2024-09-30 PROCEDURE — 82945 GLUCOSE OTHER FLUID: CPT

## 2024-09-30 PROCEDURE — 62328 DX LMBR SPI PNXR W/FLUOR/CT: CPT

## 2024-09-30 PROCEDURE — 87070 CULTURE OTHR SPECIMN AEROBIC: CPT

## 2024-09-30 NOTE — DISCHARGE INSTRUCTIONS
Spinal Tap (Lumbar Puncture): What to Expect at Home  Your Recovery     A spinal tap (also called a lumbar puncture) is a test to check the fluid that surrounds and protects your spinal cord and brain. Your doctor may have done this test to look for an infection. Sometimes it's done to release pressure from too much fluid or to look for diseases such as multiple sclerosis.  You may feel tired, and your back may be sore where the needle went in (the puncture site). You may have a mild headache for a day or two. This can happen when some of the spinal fluid is removed. Drinking extra fluids, taking pain medicine, and lying down for several hours after the procedure may help the headache be less severe. Some people also have trouble sleeping for a day or two.  The fluid taken during a spinal tap is often sent to a lab for tests. Your doctor or nurse will call you with the test results.  This care sheet gives you a general idea about how long it will take for you to recover. But each person recovers at a different pace. Follow the steps below to get better as quickly as possible.  How can you care for yourself at home?  Activity    Your doctor may tell you to lie flat in bed for 1 to 4 hours after the procedure.     Rest when you feel tired. Getting enough sleep will help you recover.     Wait 24 hours to shower. Do not take a tub bath or go swimming for 2 weeks.   Do not drive for 24 hours.   Diet    Drink extra fluids after the procedure to help a headache be less severe.   Medicines    If you stopped taking aspirin or some other blood thinner, your doctor will tell you when to start taking it again.     If you have pain, take pain medicines exactly as directed.  If the doctor gave you a prescription medicine for pain, take it as prescribed.  If you are not taking a prescription pain medicine, ask your doctor if you can take an over-the-counter medicine.     If you think your pain medicine is making you sick to

## 2024-10-01 LAB
BACTERIA SPEC CULT: NORMAL
GRAM STN SPEC: NORMAL
GRAM STN SPEC: NORMAL
SERVICE CMNT-IMP: NORMAL

## 2024-10-02 ENCOUNTER — TELEPHONE (OUTPATIENT)
Age: 79
End: 2024-10-02

## 2024-10-04 NOTE — TELEPHONE ENCOUNTER
Calling back requesting phone call asap. States that she keeps receiving phone call from patient in regards to order.    Please contact [phone]

## 2024-10-07 LAB
BACTERIA SPEC CULT: NORMAL
GRAM STN SPEC: NORMAL
GRAM STN SPEC: NORMAL
SERVICE CMNT-IMP: NORMAL

## 2024-10-11 ENCOUNTER — TELEPHONE (OUTPATIENT)
Age: 79
End: 2024-10-11

## 2024-10-11 ENCOUNTER — TRANSCRIBE ORDERS (OUTPATIENT)
Facility: HOSPITAL | Age: 79
End: 2024-10-11

## 2024-10-11 DIAGNOSIS — F03.A0 MILD DEMENTIA, UNSPECIFIED DEMENTIA TYPE, UNSPECIFIED WHETHER BEHAVIORAL, PSYCHOTIC, OR MOOD DISTURBANCE OR ANXIETY (HCC): Primary | ICD-10-CM

## 2024-10-11 NOTE — TELEPHONE ENCOUNTER
[facility] Scheduling is calling as well to request a new order so the patient can be scheduled for Monday.

## 2024-10-11 NOTE — TELEPHONE ENCOUNTER
[facility] Director Davina is asking for a call back on her cell phone ([phone]) regarding the patient's procedure.    She also would like to discuss labs for the patient as well.

## 2024-10-11 NOTE — TELEPHONE ENCOUNTER
Patient's  states he spoke with \"[facility]\" about a lumbar puncture his wife is supposed to have.    He states according to radiology she is scheduled for Monday.    PSR does not see any appointment on the books currently for his wife.    PSR sees where it looks like our office is still working on the actual order.    Patient's  is confused as to what is going on and what problems if any there are with the order.    He states his wife was already scheduled for this once and it had to be cancelled as Radiology was missing a \"tube\" that was needed.

## 2024-10-14 ENCOUNTER — HOSPITAL ENCOUNTER (OUTPATIENT)
Facility: HOSPITAL | Age: 79
Discharge: HOME | End: 2024-10-17
Attending: PSYCHIATRY & NEUROLOGY
Payer: MEDICARE

## 2024-10-14 DIAGNOSIS — F03.A0 MILD DEMENTIA, UNSPECIFIED DEMENTIA TYPE, UNSPECIFIED WHETHER BEHAVIORAL, PSYCHOTIC, OR MOOD DISTURBANCE OR ANXIETY (HCC): ICD-10-CM

## 2024-10-14 DIAGNOSIS — F03.A0 MILD DEMENTIA, UNSPECIFIED DEMENTIA TYPE, UNSPECIFIED WHETHER BEHAVIORAL, PSYCHOTIC, OR MOOD DISTURBANCE OR ANXIETY (HCC): Primary | ICD-10-CM

## 2024-10-14 PROCEDURE — 62328 DX LMBR SPI PNXR W/FLUOR/CT: CPT

## 2024-10-14 NOTE — DISCHARGE INSTRUCTIONS
LUMBAR PUNCTURE DISCHARGE INSTRUCTIONS        GENERAL INFORMATION:    A needle aspiration is removal of a small amount of tissue or fluid. The tissue or fluid is then examined in the laboratory.     INSTRUCTIONS:     Cover aspiration site with a sterile band aid.  Keep the area clean and dry until next shower, when you can discard the band aid.   You should be on a light activity schedule for the next 24-48 hours.  Drink extra fluids today and tomorrow.  You may take a non-aspirin pain reliever if necessary.        CALL YOUR REFERRING PHYSICIAN IF:     You have any sever redness or swelling, or temperature over 101°F.  You have any drainage of fluid from the lumbar site, especially if you have yellow or green discharge, or any bleeding more than a spot on the dressing.  You have any severe pain or bruising in the area for more than a few days.  You have worsening head or neck pain that lasts longer than 24 hours.   You have repeated vomiting.  You have reduced alertness or difficulty awakening.  You have dizziness or balance difficulties or visual blurring or disturbance.  You have light or noise sensitivity.  You have any increase in pain, weakness or numbness in your legs.  You have any problems urinating or having a bowel movement.     Your referring physician will call you with results in a few days.     We provide this literature for patients and family members.  It is intended to be educational supplement that highlights some of the important points of what we have previously discussed.

## 2024-10-14 NOTE — PROGRESS NOTES
Patient discharge teaching completed with patient, understanding verbalized and patient discharged via wheelchair with

## 2024-10-15 RX ORDER — DOXYCYCLINE HYCLATE 20 MG
TABLET ORAL
Qty: 60 TABLET | Refills: 1 | Status: SHIPPED | OUTPATIENT
Start: 2024-10-15

## 2024-10-16 LAB
BETA-AMYLOID 40: 9897 PG/ML
BETA-AMYLOID 42/40 RATIO: 0.06
BETA-AMYLOID 42: 589 PG/ML
INTERPRETATION: NORMAL
REFERENCES: NORMAL

## 2024-10-17 ENCOUNTER — TELEPHONE (OUTPATIENT)
Age: 79
End: 2024-10-17

## 2024-10-17 NOTE — TELEPHONE ENCOUNTER
----- Message from Dr. Evelia Craig DO sent at 10/17/2024 12:36 PM EDT -----  Amyoid ratio values are greater than 0.058 which indicates a lower likelihood of AD diagnosis. Based on current testing, she would not be a candidate for lecanemab/donanemab infusions. RMD  ----- Message -----  From: Kalin Shearer [facility]  Sent: 10/16/2024   4:37 PM EDT  To: Evelia Craig DO

## 2024-10-17 NOTE — TELEPHONE ENCOUNTER
Called the Pt. However had to leave a voice mail with the following results. Per Dr. Craig:      Amyloid ratio values are greater than 0.058 which indicates a lower likelihood of AD diagnosis. Based on current testing, she would not be a candidate for lecanemab/donanemab infusions. IDANIA

## 2024-11-11 ENCOUNTER — OFFICE VISIT (OUTPATIENT)
Age: 79
End: 2024-11-11
Payer: MEDICARE

## 2024-11-11 VITALS
WEIGHT: 165.6 LBS | BODY MASS INDEX: 27.59 KG/M2 | HEIGHT: 65 IN | HEART RATE: 76 BPM | TEMPERATURE: 98.8 F | DIASTOLIC BLOOD PRESSURE: 77 MMHG | OXYGEN SATURATION: 100 % | RESPIRATION RATE: 16 BRPM | SYSTOLIC BLOOD PRESSURE: 109 MMHG

## 2024-11-11 DIAGNOSIS — N30.00 ACUTE CYSTITIS WITHOUT HEMATURIA: ICD-10-CM

## 2024-11-11 DIAGNOSIS — N30.00 ACUTE CYSTITIS WITHOUT HEMATURIA: Primary | ICD-10-CM

## 2024-11-11 LAB
BILIRUBIN, URINE, POC: ABNORMAL
BLOOD URINE, POC: NEGATIVE
GLUCOSE URINE, POC: NEGATIVE
KETONES, URINE, POC: NEGATIVE
LEUKOCYTE ESTERASE, URINE, POC: ABNORMAL
NITRITE, URINE, POC: NEGATIVE
PH, URINE, POC: 5 (ref 4.6–8)
PROTEIN,URINE, POC: NEGATIVE
SPECIFIC GRAVITY, URINE, POC: 1.03 (ref 1–1.03)
URINALYSIS CLARITY, POC: CLEAR
URINALYSIS COLOR, POC: YELLOW
UROBILINOGEN, POC: ABNORMAL MG/DL

## 2024-11-11 PROCEDURE — 81001 URINALYSIS AUTO W/SCOPE: CPT

## 2024-11-11 PROCEDURE — 99213 OFFICE O/P EST LOW 20 MIN: CPT

## 2024-11-11 RX ORDER — NITROFURANTOIN 25; 75 MG/1; MG/1
100 CAPSULE ORAL 2 TIMES DAILY
Qty: 10 CAPSULE | Refills: 0 | Status: SHIPPED | OUTPATIENT
Start: 2024-11-11 | End: 2024-11-16

## 2024-11-11 SDOH — ECONOMIC STABILITY: FOOD INSECURITY: WITHIN THE PAST 12 MONTHS, THE FOOD YOU BOUGHT JUST DIDN'T LAST AND YOU DIDN'T HAVE MONEY TO GET MORE.: NEVER TRUE

## 2024-11-11 SDOH — ECONOMIC STABILITY: FOOD INSECURITY: WITHIN THE PAST 12 MONTHS, YOU WORRIED THAT YOUR FOOD WOULD RUN OUT BEFORE YOU GOT MONEY TO BUY MORE.: NEVER TRUE

## 2024-11-11 SDOH — ECONOMIC STABILITY: INCOME INSECURITY: HOW HARD IS IT FOR YOU TO PAY FOR THE VERY BASICS LIKE FOOD, HOUSING, MEDICAL CARE, AND HEATING?: NOT HARD AT ALL

## 2024-11-11 ASSESSMENT — ENCOUNTER SYMPTOMS
VOMITING: 0
WHEEZING: 0
CONSTIPATION: 0
NAUSEA: 0
COUGH: 0
SINUS PAIN: 0
CHEST TIGHTNESS: 0
SHORTNESS OF BREATH: 0
ABDOMINAL PAIN: 0
DIARRHEA: 0
BACK PAIN: 0
SORE THROAT: 0

## 2024-11-11 NOTE — PROGRESS NOTES
Aida Louis is a 79 y.o. female who was seen in clinic today (11/11/2024) for an acute visit.      Assessment & Plan:   Below is the assessment and plan developed based on review of pertinent history, physical exam, labs, studies, and medications.    Assessment & Plan  Acute cystitis without hematuria   Acute condition, new, UA with positive leukocyte esterase in setting of increased urinary frequency/urgency. Will treat empirically with Macrobid and senf urine cx for speciation/sensitivities. Advised to call if not getting better.    Orders:    AMB POC URINALYSIS DIP STICK AUTO W/ MICRO    Culture, Urine; Future    nitrofurantoin, macrocrystal-monohydrate, (MACROBID) 100 MG capsule; Take 1 capsule by mouth 2 times daily for 5 days      Subjective/Objective:   Aida was seen today for Urinary Tract Infection    Starting end of last, notes fatigue, increase urinary frequency/urgency, and generally feeling unwell. Denies fevers, chills, denies abdominal nor flank pain. Reports symptoms are \"much better\" with increased fluid intake.    UA with trace leukocyte esterase, negative nitrite, no blood.    Prior to Admission medications    Medication Sig Start Date End Date Taking? Authorizing Provider   doxycycline hyclate (PERIOSTAT) 20 MG tablet TAKE 2 TABLETS BY MOUTH TWICE DAILY 10/15/24  Yes Ruben Orosco MD   donepezil (ARICEPT) 10 MG tablet Start trial of Aricept 5mg qhs x 1 week, then increase to 10mg qhs 9/5/24  Yes Evelia Craig,    atorvastatin (LIPITOR) 20 MG tablet TAKE 1/2 TABLET BY MOUTH DAILY 11/9/23  Yes Ruben Orosco MD   vitamin D 25 MCG (1000 UT) CAPS Take 1 capsule by mouth daily   Yes Automatic Reconciliation, Ar        Review of Systems   Constitutional:  Negative for activity change, chills, fatigue and fever.   HENT:  Negative for sinus pain and sore throat.    Eyes:  Negative for visual disturbance.   Respiratory:  Negative for cough, chest tightness, shortness of breath and wheezing.

## 2024-11-14 LAB
BACTERIA SPEC CULT: ABNORMAL
CC UR VC: ABNORMAL
SERVICE CMNT-IMP: ABNORMAL

## 2024-11-25 ENCOUNTER — OFFICE VISIT (OUTPATIENT)
Age: 79
End: 2024-11-25
Payer: MEDICARE

## 2024-11-25 VITALS
TEMPERATURE: 98.1 F | DIASTOLIC BLOOD PRESSURE: 86 MMHG | RESPIRATION RATE: 16 BRPM | HEIGHT: 65 IN | HEART RATE: 78 BPM | WEIGHT: 163.8 LBS | SYSTOLIC BLOOD PRESSURE: 136 MMHG | OXYGEN SATURATION: 95 % | BODY MASS INDEX: 27.29 KG/M2

## 2024-11-25 DIAGNOSIS — G47.9 SLEEP DIFFICULTIES: Primary | ICD-10-CM

## 2024-11-25 DIAGNOSIS — G47.9 SLEEP DIFFICULTIES: ICD-10-CM

## 2024-11-25 PROCEDURE — 1090F PRES/ABSN URINE INCON ASSESS: CPT

## 2024-11-25 PROCEDURE — 99213 OFFICE O/P EST LOW 20 MIN: CPT

## 2024-11-25 PROCEDURE — 1126F AMNT PAIN NOTED NONE PRSNT: CPT

## 2024-11-25 PROCEDURE — G8400 PT W/DXA NO RESULTS DOC: HCPCS

## 2024-11-25 PROCEDURE — 1159F MED LIST DOCD IN RCRD: CPT

## 2024-11-25 PROCEDURE — G8427 DOCREV CUR MEDS BY ELIG CLIN: HCPCS

## 2024-11-25 PROCEDURE — 1123F ACP DISCUSS/DSCN MKR DOCD: CPT

## 2024-11-25 PROCEDURE — G8417 CALC BMI ABV UP PARAM F/U: HCPCS

## 2024-11-25 PROCEDURE — 1036F TOBACCO NON-USER: CPT

## 2024-11-25 PROCEDURE — G8484 FLU IMMUNIZE NO ADMIN: HCPCS

## 2024-11-25 ASSESSMENT — ENCOUNTER SYMPTOMS
SINUS PAIN: 0
SORE THROAT: 0
CHEST TIGHTNESS: 0
COUGH: 0
ABDOMINAL PAIN: 0
DIARRHEA: 0
SHORTNESS OF BREATH: 0
BACK PAIN: 0
WHEEZING: 0
CONSTIPATION: 0
VOMITING: 0
NAUSEA: 0

## 2024-11-25 NOTE — PROGRESS NOTES
Aida Louis is a 79 y.o. female who was seen in clinic today (11/25/2024) for an acute visit.      Assessment & Plan:   Below is the assessment and plan developed based on review of pertinent history, physical exam, labs, studies, and medications.    Assessment & Plan  Sleep difficulties   Acute condition, new,  Had one episode of difficulty sleeping last night, which she attributes to stress related to the upcoming holidays. Initially planned to cancel appt as she is asymptomatic currently. Discussed potential PRN use of Hydroxyzine to use at bedtime to help her get through the holidays but she declines for now. Will check routine metabolic labs to ensure no underlying etiology we are missing. Advised mindfulness, taking deep breaths and distracting herself at night should this issue recur.     Orders:    Comprehensive Metabolic Panel; Future    CBC with Auto Differential; Future    TSH; Future    T4, Free; Future      Subjective/Objective:   Aida was seen today for No chief complaint on file.    Aida is a 79 year-old woman with medical history of MCI and HLD who presents for evaluation of sleep issues. She was last seen 11/11 for a UTI with pan-sensitive Klebsiella, treated with a 5d course of Macrobid which completely resolved her symptoms. She presents today after an issue with sleeping last night. She endorses significant stress related to the upcoming holidays and coordinating family events. Because of this, she had trouble falling asleep and tossed/turned in bed in anticipation of the event. Listed reason for appt was concerns for a UTI, however, she categorically denies any urinary symptoms and has had none since treatment for UTI on 11/11/24.    Prior to Admission medications    Medication Sig Start Date End Date Taking? Authorizing Provider   doxycycline hyclate (PERIOSTAT) 20 MG tablet TAKE 2 TABLETS BY MOUTH TWICE DAILY 10/15/24  Yes Ruben Orosco MD   donepezil (ARICEPT) 10 MG tablet Start trial of

## 2024-11-26 LAB
ALBUMIN SERPL-MCNC: 3.9 G/DL (ref 3.5–5)
ALBUMIN/GLOB SERPL: 1.3 (ref 1.1–2.2)
ALP SERPL-CCNC: 98 U/L (ref 45–117)
ALT SERPL-CCNC: 18 U/L (ref 12–78)
ANION GAP SERPL CALC-SCNC: 7 MMOL/L (ref 2–12)
AST SERPL-CCNC: 15 U/L (ref 15–37)
BASOPHILS # BLD: 0 K/UL (ref 0–0.1)
BASOPHILS NFR BLD: 0 % (ref 0–1)
BILIRUB SERPL-MCNC: 0.5 MG/DL (ref 0.2–1)
BUN SERPL-MCNC: 13 MG/DL (ref 6–20)
BUN/CREAT SERPL: 17 (ref 12–20)
CALCIUM SERPL-MCNC: 9.9 MG/DL (ref 8.5–10.1)
CHLORIDE SERPL-SCNC: 104 MMOL/L (ref 97–108)
CO2 SERPL-SCNC: 28 MMOL/L (ref 21–32)
CREAT SERPL-MCNC: 0.77 MG/DL (ref 0.55–1.02)
DIFFERENTIAL METHOD BLD: NORMAL
EOSINOPHIL # BLD: 0.1 K/UL (ref 0–0.4)
EOSINOPHIL NFR BLD: 2 % (ref 0–7)
ERYTHROCYTE [DISTWIDTH] IN BLOOD BY AUTOMATED COUNT: 13.1 % (ref 11.5–14.5)
GLOBULIN SER CALC-MCNC: 3 G/DL (ref 2–4)
GLUCOSE SERPL-MCNC: 96 MG/DL (ref 65–100)
HCT VFR BLD AUTO: 45.7 % (ref 35–47)
HGB BLD-MCNC: 15.1 G/DL (ref 11.5–16)
IMM GRANULOCYTES # BLD AUTO: 0 K/UL (ref 0–0.04)
IMM GRANULOCYTES NFR BLD AUTO: 0 % (ref 0–0.5)
LYMPHOCYTES # BLD: 0.8 K/UL (ref 0.8–3.5)
LYMPHOCYTES NFR BLD: 15 % (ref 12–49)
MCH RBC QN AUTO: 30.3 PG (ref 26–34)
MCHC RBC AUTO-ENTMCNC: 33 G/DL (ref 30–36.5)
MCV RBC AUTO: 91.8 FL (ref 80–99)
MONOCYTES # BLD: 0.7 K/UL (ref 0–1)
MONOCYTES NFR BLD: 13 % (ref 5–13)
NEUTS SEG # BLD: 4 K/UL (ref 1.8–8)
NEUTS SEG NFR BLD: 70 % (ref 32–75)
NRBC # BLD: 0 K/UL (ref 0–0.01)
NRBC BLD-RTO: 0 PER 100 WBC
PLATELET # BLD AUTO: 202 K/UL (ref 150–400)
PMV BLD AUTO: 11.3 FL (ref 8.9–12.9)
POTASSIUM SERPL-SCNC: 4 MMOL/L (ref 3.5–5.1)
PROT SERPL-MCNC: 6.9 G/DL (ref 6.4–8.2)
RBC # BLD AUTO: 4.98 M/UL (ref 3.8–5.2)
SODIUM SERPL-SCNC: 139 MMOL/L (ref 136–145)
T4 FREE SERPL-MCNC: 0.9 NG/DL (ref 0.8–1.5)
TSH SERPL DL<=0.05 MIU/L-ACNC: 1.14 UIU/ML (ref 0.36–3.74)
WBC # BLD AUTO: 5.6 K/UL (ref 3.6–11)

## 2025-01-30 RX ORDER — ATORVASTATIN CALCIUM 20 MG/1
TABLET, FILM COATED ORAL
Qty: 45 TABLET | Refills: 0 | Status: SHIPPED | OUTPATIENT
Start: 2025-01-30

## 2025-01-30 NOTE — TELEPHONE ENCOUNTER
Chief Complaint   Patient presents with    Medication Refill     Last Appointment with Dr. Ruben Orosco:  1/11/2024   Future Appointments   Date Time Provider Department Center   2/13/2025 10:45 AM Ruben Orosco MD [facility]   4/7/2025 10:40 AM Evelia Craig DO [facility]        English

## 2025-01-30 NOTE — TELEPHONE ENCOUNTER
Chief Complaint   Patient presents with    Medication Refill     Last Appointment with Dr. Ruben Orosco:  1/11/2024     Future Appointments   Date Time Provider Department Center   4/7/2025 10:40 AM Evelia Craig DO [facility]

## 2025-02-13 ENCOUNTER — OFFICE VISIT (OUTPATIENT)
Age: 80
End: 2025-02-13
Payer: MEDICARE

## 2025-02-13 VITALS
SYSTOLIC BLOOD PRESSURE: 115 MMHG | HEART RATE: 77 BPM | OXYGEN SATURATION: 96 % | WEIGHT: 163.2 LBS | RESPIRATION RATE: 15 BRPM | TEMPERATURE: 98 F | HEIGHT: 65 IN | BODY MASS INDEX: 27.19 KG/M2 | DIASTOLIC BLOOD PRESSURE: 75 MMHG

## 2025-02-13 DIAGNOSIS — E78.2 MIXED HYPERLIPIDEMIA: Primary | ICD-10-CM

## 2025-02-13 DIAGNOSIS — E78.2 MIXED HYPERLIPIDEMIA: ICD-10-CM

## 2025-02-13 DIAGNOSIS — Z78.0 MENOPAUSE: ICD-10-CM

## 2025-02-13 DIAGNOSIS — N32.81 OAB (OVERACTIVE BLADDER): ICD-10-CM

## 2025-02-13 DIAGNOSIS — R73.01 FASTING HYPERGLYCEMIA: ICD-10-CM

## 2025-02-13 LAB
ALBUMIN SERPL-MCNC: 3.9 G/DL (ref 3.5–5)
ALBUMIN/GLOB SERPL: 1.3 (ref 1.1–2.2)
ALP SERPL-CCNC: 86 U/L (ref 45–117)
ALT SERPL-CCNC: 18 U/L (ref 12–78)
ANION GAP SERPL CALC-SCNC: 3 MMOL/L (ref 2–12)
AST SERPL-CCNC: 20 U/L (ref 15–37)
BILIRUB SERPL-MCNC: 0.5 MG/DL (ref 0.2–1)
BUN SERPL-MCNC: 19 MG/DL (ref 6–20)
BUN/CREAT SERPL: 25 (ref 12–20)
CALCIUM SERPL-MCNC: 9.8 MG/DL (ref 8.5–10.1)
CHLORIDE SERPL-SCNC: 107 MMOL/L (ref 97–108)
CHOLEST SERPL-MCNC: 222 MG/DL
CO2 SERPL-SCNC: 32 MMOL/L (ref 21–32)
CREAT SERPL-MCNC: 0.75 MG/DL (ref 0.55–1.02)
GLOBULIN SER CALC-MCNC: 3.1 G/DL (ref 2–4)
GLUCOSE SERPL-MCNC: 69 MG/DL (ref 65–100)
HDLC SERPL-MCNC: 72 MG/DL
HDLC SERPL: 3.1 (ref 0–5)
LDLC SERPL CALC-MCNC: 119.2 MG/DL (ref 0–100)
POTASSIUM SERPL-SCNC: 5.3 MMOL/L (ref 3.5–5.1)
PROT SERPL-MCNC: 7 G/DL (ref 6.4–8.2)
SODIUM SERPL-SCNC: 142 MMOL/L (ref 136–145)
TRIGL SERPL-MCNC: 154 MG/DL
VLDLC SERPL CALC-MCNC: 30.8 MG/DL

## 2025-02-13 PROCEDURE — G8400 PT W/DXA NO RESULTS DOC: HCPCS | Performed by: INTERNAL MEDICINE

## 2025-02-13 PROCEDURE — 1090F PRES/ABSN URINE INCON ASSESS: CPT | Performed by: INTERNAL MEDICINE

## 2025-02-13 PROCEDURE — 99214 OFFICE O/P EST MOD 30 MIN: CPT | Performed by: INTERNAL MEDICINE

## 2025-02-13 PROCEDURE — 1159F MED LIST DOCD IN RCRD: CPT | Performed by: INTERNAL MEDICINE

## 2025-02-13 PROCEDURE — 1123F ACP DISCUSS/DSCN MKR DOCD: CPT | Performed by: INTERNAL MEDICINE

## 2025-02-13 PROCEDURE — 1126F AMNT PAIN NOTED NONE PRSNT: CPT | Performed by: INTERNAL MEDICINE

## 2025-02-13 PROCEDURE — G8417 CALC BMI ABV UP PARAM F/U: HCPCS | Performed by: INTERNAL MEDICINE

## 2025-02-13 PROCEDURE — 1036F TOBACCO NON-USER: CPT | Performed by: INTERNAL MEDICINE

## 2025-02-13 PROCEDURE — G8427 DOCREV CUR MEDS BY ELIG CLIN: HCPCS | Performed by: INTERNAL MEDICINE

## 2025-02-13 SDOH — ECONOMIC STABILITY: FOOD INSECURITY: WITHIN THE PAST 12 MONTHS, THE FOOD YOU BOUGHT JUST DIDN'T LAST AND YOU DIDN'T HAVE MONEY TO GET MORE.: NEVER TRUE

## 2025-02-13 SDOH — ECONOMIC STABILITY: FOOD INSECURITY: WITHIN THE PAST 12 MONTHS, YOU WORRIED THAT YOUR FOOD WOULD RUN OUT BEFORE YOU GOT MONEY TO BUY MORE.: NEVER TRUE

## 2025-02-13 ASSESSMENT — PATIENT HEALTH QUESTIONNAIRE - PHQ9
SUM OF ALL RESPONSES TO PHQ9 QUESTIONS 1 & 2: 0
2. FEELING DOWN, DEPRESSED OR HOPELESS: NOT AT ALL
SUM OF ALL RESPONSES TO PHQ QUESTIONS 1-9: 0
SUM OF ALL RESPONSES TO PHQ QUESTIONS 1-9: 0
1. LITTLE INTEREST OR PLEASURE IN DOING THINGS: NOT AT ALL
SUM OF ALL RESPONSES TO PHQ QUESTIONS 1-9: 0
SUM OF ALL RESPONSES TO PHQ QUESTIONS 1-9: 0

## 2025-02-13 NOTE — PROGRESS NOTES
HPI:  Aida Louis is a 79 y.o. year old female who is here for a follow-up visit.  She has been walking for exercise regularly.  Weight is stable.  She is due for follow-up of her lipids.  She was diagnosed with mild dementia and this is stable on Aricept.  She has a mammogram scheduled for next month.  Denies headache or dizziness.  Denies chest pains or shortness of breath.  Denies cough or wheeze.  No change in bowel or bladder habits.      Past Medical History:   Diagnosis Date    GERD (gastroesophageal reflux disease)     Mixed hyperlipidemia 2/28/2020    OAB (overactive bladder) 2/28/2020       Past Surgical History:   Procedure Laterality Date    CATARACT REMOVAL Bilateral     CHOLECYSTECTOMY      COLONOSCOPY N/A 9/7/2018    COLONOSCOPY performed by Satnam Snow MD at [facility] ENDOSCOPY    COLONOSCOPY N/A 10/29/2021    COLONOSCOPY   :- performed by Satnam Snow MD at [facility] ENDOSCOPY    GI      surgery for rectal fissure    GI      colonoscopy - colon polyp    TONSILLECTOMY         Prior to Admission medications    Medication Sig Start Date End Date Taking? Authorizing Provider   atorvastatin (LIPITOR) 20 MG tablet TAKE 1/2 TABLET BY MOUTH DAILY 1/30/25  Yes Ruben Orosco MD   vitamin D 25 MCG (1000 UT) CAPS Take 1 capsule by mouth daily   Yes Automatic Reconciliation, Ar   donepezil (ARICEPT) 10 MG tablet Start trial of Aricept 5mg qhs x 1 week, then increase to 10mg qhs  Patient not taking: Reported on 2/13/2025 9/5/24   Evelia Craig, DO       Social History     Socioeconomic History    Marital status:      Spouse name: Not on file    Number of children: Not on file    Years of education: Not on file    Highest education level: Not on file   Occupational History    Not on file   Tobacco Use    Smoking status: Never     Passive exposure: Never    Smokeless tobacco: Never   Vaping Use    Vaping status: Never Used   Substance and Sexual Activity    Alcohol use: Yes     Alcohol/week:

## 2025-02-14 RX ORDER — ATORVASTATIN CALCIUM 20 MG/1
20 TABLET, FILM COATED ORAL DAILY
Qty: 90 TABLET | Refills: 1 | Status: SHIPPED | OUTPATIENT
Start: 2025-02-14

## 2025-02-26 ENCOUNTER — HOSPITAL ENCOUNTER (OUTPATIENT)
Facility: HOSPITAL | Age: 80
Discharge: HOME | End: 2025-03-01
Attending: INTERNAL MEDICINE
Payer: MEDICARE

## 2025-02-26 DIAGNOSIS — Z78.0 MENOPAUSE: ICD-10-CM

## 2025-02-26 PROCEDURE — 77080 DXA BONE DENSITY AXIAL: CPT

## 2025-03-05 ENCOUNTER — OFFICE VISIT (OUTPATIENT)
Age: 80
End: 2025-03-05
Payer: MEDICARE

## 2025-03-05 ENCOUNTER — TELEPHONE (OUTPATIENT)
Age: 80
End: 2025-03-05

## 2025-03-05 VITALS
HEIGHT: 65 IN | SYSTOLIC BLOOD PRESSURE: 128 MMHG | WEIGHT: 163.2 LBS | OXYGEN SATURATION: 96 % | BODY MASS INDEX: 27.19 KG/M2 | DIASTOLIC BLOOD PRESSURE: 77 MMHG | RESPIRATION RATE: 16 BRPM | HEART RATE: 78 BPM | TEMPERATURE: 97.5 F

## 2025-03-05 DIAGNOSIS — R35.0 URINARY FREQUENCY: Primary | ICD-10-CM

## 2025-03-05 DIAGNOSIS — N30.00 ACUTE CYSTITIS WITHOUT HEMATURIA: ICD-10-CM

## 2025-03-05 LAB
BILIRUBIN, URINE, POC: NORMAL
BLOOD URINE, POC: NORMAL
GLUCOSE URINE, POC: NORMAL
KETONES, URINE, POC: NEGATIVE
LEUKOCYTE ESTERASE, URINE, POC: NORMAL
NITRITE, URINE, POC: NEGATIVE
PH, URINE, POC: 5.5 (ref 4.6–8)
PROTEIN,URINE, POC: NORMAL
SPECIFIC GRAVITY, URINE, POC: 1.03 (ref 1–1.03)
URINALYSIS CLARITY, POC: NORMAL
URINALYSIS COLOR, POC: NORMAL
UROBILINOGEN, POC: NORMAL MG/DL

## 2025-03-05 PROCEDURE — 1160F RVW MEDS BY RX/DR IN RCRD: CPT | Performed by: NURSE PRACTITIONER

## 2025-03-05 PROCEDURE — G8417 CALC BMI ABV UP PARAM F/U: HCPCS | Performed by: NURSE PRACTITIONER

## 2025-03-05 PROCEDURE — G8427 DOCREV CUR MEDS BY ELIG CLIN: HCPCS | Performed by: NURSE PRACTITIONER

## 2025-03-05 PROCEDURE — 99213 OFFICE O/P EST LOW 20 MIN: CPT | Performed by: NURSE PRACTITIONER

## 2025-03-05 PROCEDURE — PBSHW AMB POC URINALYSIS DIP STICK AUTO W/ MICRO: Performed by: NURSE PRACTITIONER

## 2025-03-05 PROCEDURE — 1126F AMNT PAIN NOTED NONE PRSNT: CPT | Performed by: NURSE PRACTITIONER

## 2025-03-05 PROCEDURE — 1159F MED LIST DOCD IN RCRD: CPT | Performed by: NURSE PRACTITIONER

## 2025-03-05 PROCEDURE — 81001 URINALYSIS AUTO W/SCOPE: CPT | Performed by: NURSE PRACTITIONER

## 2025-03-05 PROCEDURE — 1090F PRES/ABSN URINE INCON ASSESS: CPT | Performed by: NURSE PRACTITIONER

## 2025-03-05 PROCEDURE — G8399 PT W/DXA RESULTS DOCUMENT: HCPCS | Performed by: NURSE PRACTITIONER

## 2025-03-05 PROCEDURE — 1123F ACP DISCUSS/DSCN MKR DOCD: CPT | Performed by: NURSE PRACTITIONER

## 2025-03-05 PROCEDURE — 1036F TOBACCO NON-USER: CPT | Performed by: NURSE PRACTITIONER

## 2025-03-05 RX ORDER — NITROFURANTOIN 25; 75 MG/1; MG/1
100 CAPSULE ORAL 2 TIMES DAILY
Qty: 10 CAPSULE | Refills: 0 | Status: SHIPPED | OUTPATIENT
Start: 2025-03-05 | End: 2025-03-10

## 2025-03-05 NOTE — PROGRESS NOTES
Aida Louis (:  1945) is a 79 y.o. female,here for evaluation of the following chief complaint(s):  Urinary Tract Infection (Started last Friday /Pressure down there)  /77   Pulse 78   Temp 97.5 °F (36.4 °C) (Temporal)   Resp 16   Ht 1.651 m (5' 5\")   Wt 74 kg (163 lb 3.2 oz)   SpO2 96%   BMI 27.16 kg/m²         SUBJECTIVE/OBJECTIVE:    HPI:Patient reports increased urinary frequency x 5 days. No dysuria. No fever. She notes suprapubic pressure.    Review of Systems   Genitourinary:  Positive for frequency.       Physical Exam  Constitutional:       Appearance: Normal appearance.   Abdominal:      Tenderness: There is no right CVA tenderness or left CVA tenderness.   Musculoskeletal:         General: Normal range of motion.   Skin:     General: Skin is warm and dry.   Neurological:      General: No focal deficit present.      Mental Status: She is alert and oriented to person, place, and time.   Psychiatric:         Mood and Affect: Mood normal.         Behavior: Behavior normal.       Results for orders placed or performed in visit on 25   AMB POC URINALYSIS DIP STICK AUTO W/ MICRO   Result Value Ref Range    Color (UA POC) Light Yellow     Clarity (UA POC) Slightly Cloudy     Glucose, Urine, POC Trace     Bilirubin, Urine, POC 2+     Ketones, Urine, POC Negative     Specific Gravity, Urine, POC 1.030 1.001 - 1.035    Blood (UA POC) Trace     pH, Urine, POC 5.5 4.6 - 8.0    Protein, Urine, POC 1+     Urobilinogen, POC 0.2 mg/dL <1.1 mg/dL    Nitrite, Urine, POC Negative Negative    Leukocyte Esterase, Urine, POC 1+           ASSESSMENT/PLAN:  1. Urinary frequency  -     AMB POC URINALYSIS DIP STICK AUTO W/ MICRO  2. Acute cystitis without hematuria  -     Culture, Urine; Future  Urine culture sent  Antibiotic as prescribed  hydrate    --JEFF Sidhu - NP

## 2025-03-05 NOTE — TELEPHONE ENCOUNTER
Returned call - pt and spouse had questions about verbiage in chart note from today's visit - reviewed w/ pt & spouse Chris on PHI.  All questions answered, no further needs at this time.

## 2025-03-05 NOTE — TELEPHONE ENCOUNTER
Reason for call:  Spoke with pt's . He would like to talk to the nurse in regards information in pt yoonehart about her stu today.   He requested a call back.     Is this a new problem: Yes    Date of last appointment:  3/5/2025     Can we respond via [employer]: No    Best call back number: Nidia Louis   [phone]

## 2025-03-08 LAB
BACTERIA SPEC CULT: ABNORMAL
BACTERIA SPEC CULT: ABNORMAL
CC UR VC: ABNORMAL
SERVICE CMNT-IMP: ABNORMAL

## 2025-03-11 ENCOUNTER — RESULTS FOLLOW-UP (OUTPATIENT)
Age: 80
End: 2025-03-11

## 2025-04-01 ENCOUNTER — TELEPHONE (OUTPATIENT)
Age: 80
End: 2025-04-01

## 2025-04-01 NOTE — TELEPHONE ENCOUNTER
Patient's  (verified) stated that he has concerns about patient's anxiety and is requesting to speak with provider. Please contact

## 2025-04-03 NOTE — TELEPHONE ENCOUNTER
Called and spoke with the Pt's .   He states the following:  She is doing reasonably well. Noticing just a gradual worsening of her memory. Repeating things more than before, executive function is gone so he is taking care of the bills now.  He states there is not an increase in anxiety however she is more hyper focused on things getting done at a certain time for example. They were recently on a trip and on a train she just had to get ready to get off the train even though it was not time. He finally had to get their luggage etc ready and go and stand with her even though it was not time.  He is now on zoloft as trying to keep her calm and on track is stressful for him. They will be here on the 7th for FU with Kiara and is ok to speak with her then.

## 2025-04-04 DIAGNOSIS — F02.A0 MILD ALZHEIMER'S DEMENTIA, UNSPECIFIED TIMING OF DEMENTIA ONSET, UNSPECIFIED WHETHER BEHAVIORAL, PSYCHOTIC, OR MOOD DISTURBANCE OR ANXIETY (HCC): Primary | ICD-10-CM

## 2025-04-04 DIAGNOSIS — G30.9 MILD ALZHEIMER'S DEMENTIA, UNSPECIFIED TIMING OF DEMENTIA ONSET, UNSPECIFIED WHETHER BEHAVIORAL, PSYCHOTIC, OR MOOD DISTURBANCE OR ANXIETY (HCC): Primary | ICD-10-CM

## 2025-04-07 ENCOUNTER — OFFICE VISIT (OUTPATIENT)
Age: 80
End: 2025-04-07
Payer: MEDICARE

## 2025-04-07 VITALS
HEIGHT: 65 IN | HEART RATE: 78 BPM | DIASTOLIC BLOOD PRESSURE: 60 MMHG | SYSTOLIC BLOOD PRESSURE: 124 MMHG | BODY MASS INDEX: 26.99 KG/M2 | OXYGEN SATURATION: 97 % | WEIGHT: 162 LBS

## 2025-04-07 DIAGNOSIS — F03.A0 MILD DEMENTIA, UNSPECIFIED DEMENTIA TYPE, UNSPECIFIED WHETHER BEHAVIORAL, PSYCHOTIC, OR MOOD DISTURBANCE OR ANXIETY (HCC): Primary | ICD-10-CM

## 2025-04-07 PROCEDURE — 96138 PSYCL/NRPSYC TECH 1ST: CPT | Performed by: PSYCHIATRY & NEUROLOGY

## 2025-04-07 PROCEDURE — 1126F AMNT PAIN NOTED NONE PRSNT: CPT | Performed by: PSYCHIATRY & NEUROLOGY

## 2025-04-07 PROCEDURE — G8417 CALC BMI ABV UP PARAM F/U: HCPCS | Performed by: PSYCHIATRY & NEUROLOGY

## 2025-04-07 PROCEDURE — 1123F ACP DISCUSS/DSCN MKR DOCD: CPT | Performed by: PSYCHIATRY & NEUROLOGY

## 2025-04-07 PROCEDURE — 99214 OFFICE O/P EST MOD 30 MIN: CPT | Performed by: PSYCHIATRY & NEUROLOGY

## 2025-04-07 PROCEDURE — 1090F PRES/ABSN URINE INCON ASSESS: CPT | Performed by: PSYCHIATRY & NEUROLOGY

## 2025-04-07 PROCEDURE — 96132 NRPSYC TST EVAL PHYS/QHP 1ST: CPT | Performed by: PSYCHIATRY & NEUROLOGY

## 2025-04-07 PROCEDURE — 1036F TOBACCO NON-USER: CPT | Performed by: PSYCHIATRY & NEUROLOGY

## 2025-04-07 PROCEDURE — 1159F MED LIST DOCD IN RCRD: CPT | Performed by: PSYCHIATRY & NEUROLOGY

## 2025-04-07 PROCEDURE — G8427 DOCREV CUR MEDS BY ELIG CLIN: HCPCS | Performed by: PSYCHIATRY & NEUROLOGY

## 2025-04-07 PROCEDURE — G8399 PT W/DXA RESULTS DOCUMENT: HCPCS | Performed by: PSYCHIATRY & NEUROLOGY

## 2025-04-07 RX ORDER — MULTIVITAMIN WITH IRON
500 TABLET ORAL DAILY
COMMUNITY

## 2025-04-07 RX ORDER — DONEPEZIL HYDROCHLORIDE 10 MG/1
10 TABLET, FILM COATED ORAL NIGHTLY
Qty: 90 TABLET | Refills: 3 | Status: SHIPPED | OUTPATIENT
Start: 2025-04-07

## 2025-04-07 NOTE — PROGRESS NOTES
Neurology Clinic Follow up Note    Patient ID:  Aida Louis  [id number]  79 y.o.  1945      Ms. Louis is here for follow up today of  Chief Complaint   Patient presents with    OTHER     Pt  returning for H/O Dementia Pt reports she feels things are good           Last Appointment With Me:  9/5/2024      Interval History:   Pt's  has noticed a gradual worsening of her memory. She is repeating things more than before. Also having more difficulty with executive functions. He is now assisting with all finances.   No reported agitation or hallucinations.   She is taking Aricept 10mg qhs-tolerates medication well.     Ability to function:  Driving: Yes, short distances around town, no issues with navigation or MVA  Finances:Handled by her   Cooking:Yes, occasionally, no concerns  Manages own medication: Yes, assisted by her    Residing: with her     Prior work-up:  Neuropsychologic testing (Dr. Monsivais) 8/23/24 c/w mild dementia, possible Alzheimer's disease  MRI Brain 5/7/24  \"Moderate generalized atrophy of the brain with asymmetric   preferential involvement of the bilateral temporal lobes\"     PMHx/ PSHx/ FHx/ SHx:  Reviewed and unchanged previous visit.   Past Medical History:   Diagnosis Date    GERD (gastroesophageal reflux disease)     Mixed hyperlipidemia 2/28/2020    OAB (overactive bladder) 2/28/2020       ROS:  Comprehensive review of systems negative except for as noted above.       Objective:       Meds:  Current Outpatient Medications   Medication Sig Dispense Refill    atorvastatin (LIPITOR) 20 MG tablet Take 1 tablet by mouth daily 90 tablet 1    donepezil (ARICEPT) 10 MG tablet Start trial of Aricept 5mg qhs x 1 week, then increase to 10mg qhs 90 tablet 1    vitamin D 25 MCG (1000 UT) CAPS Take 1 capsule by mouth daily       No current facility-administered medications for this visit.       Exam:  /60   Pulse 78   Ht 1.651 m (5' 5\")   Wt 73.5 kg (162 lb)

## 2025-05-08 RX ORDER — ATORVASTATIN CALCIUM 20 MG/1
10 TABLET, FILM COATED ORAL DAILY
Qty: 45 TABLET | Refills: 3 | Status: SHIPPED | OUTPATIENT
Start: 2025-05-08

## 2025-06-02 RX ORDER — MEMANTINE HYDROCHLORIDE 5 MG/1
5 TABLET ORAL 2 TIMES DAILY
Qty: 180 TABLET | Refills: 0 | Status: SHIPPED | OUTPATIENT
Start: 2025-06-02

## 2025-08-06 SDOH — HEALTH STABILITY: PHYSICAL HEALTH: ON AVERAGE, HOW MANY MINUTES DO YOU ENGAGE IN EXERCISE AT THIS LEVEL?: 40 MIN

## 2025-08-06 SDOH — HEALTH STABILITY: PHYSICAL HEALTH: ON AVERAGE, HOW MANY DAYS PER WEEK DO YOU ENGAGE IN MODERATE TO STRENUOUS EXERCISE (LIKE A BRISK WALK)?: 4 DAYS

## 2025-08-06 ASSESSMENT — PATIENT HEALTH QUESTIONNAIRE - PHQ9
2. FEELING DOWN, DEPRESSED OR HOPELESS: NOT AT ALL
SUM OF ALL RESPONSES TO PHQ QUESTIONS 1-9: 0
1. LITTLE INTEREST OR PLEASURE IN DOING THINGS: NOT AT ALL
SUM OF ALL RESPONSES TO PHQ QUESTIONS 1-9: 0

## 2025-08-06 ASSESSMENT — LIFESTYLE VARIABLES
HOW OFTEN DO YOU HAVE SIX OR MORE DRINKS ON ONE OCCASION: 1
HOW MANY STANDARD DRINKS CONTAINING ALCOHOL DO YOU HAVE ON A TYPICAL DAY: 1 OR 2
HOW MANY STANDARD DRINKS CONTAINING ALCOHOL DO YOU HAVE ON A TYPICAL DAY: 1

## 2025-08-07 ENCOUNTER — TELEMEDICINE (OUTPATIENT)
Age: 80
End: 2025-08-07
Payer: MEDICARE

## 2025-08-07 DIAGNOSIS — Z00.00 MEDICARE ANNUAL WELLNESS VISIT, SUBSEQUENT: Primary | ICD-10-CM

## 2025-08-07 DIAGNOSIS — F03.90 DEMENTIA WITHOUT BEHAVIORAL DISTURBANCE, PSYCHOTIC DISTURBANCE, MOOD DISTURBANCE, OR ANXIETY, UNSPECIFIED DEMENTIA SEVERITY, UNSPECIFIED DEMENTIA TYPE (HCC): ICD-10-CM

## 2025-08-07 PROCEDURE — 1090F PRES/ABSN URINE INCON ASSESS: CPT | Performed by: NURSE PRACTITIONER

## 2025-08-07 PROCEDURE — 99213 OFFICE O/P EST LOW 20 MIN: CPT | Performed by: NURSE PRACTITIONER

## 2025-08-07 PROCEDURE — 1160F RVW MEDS BY RX/DR IN RCRD: CPT | Performed by: NURSE PRACTITIONER

## 2025-08-07 PROCEDURE — G8417 CALC BMI ABV UP PARAM F/U: HCPCS | Performed by: NURSE PRACTITIONER

## 2025-08-07 PROCEDURE — 1159F MED LIST DOCD IN RCRD: CPT | Performed by: NURSE PRACTITIONER

## 2025-08-07 PROCEDURE — G8399 PT W/DXA RESULTS DOCUMENT: HCPCS | Performed by: NURSE PRACTITIONER

## 2025-08-07 PROCEDURE — G0439 PPPS, SUBSEQ VISIT: HCPCS | Performed by: NURSE PRACTITIONER

## 2025-08-07 PROCEDURE — G8427 DOCREV CUR MEDS BY ELIG CLIN: HCPCS | Performed by: NURSE PRACTITIONER

## 2025-08-07 PROCEDURE — 1036F TOBACCO NON-USER: CPT | Performed by: NURSE PRACTITIONER

## 2025-08-07 PROCEDURE — 1123F ACP DISCUSS/DSCN MKR DOCD: CPT | Performed by: NURSE PRACTITIONER

## 2025-08-08 ENCOUNTER — PATIENT MESSAGE (OUTPATIENT)
Age: 80
End: 2025-08-08

## 2025-08-11 ENCOUNTER — OFFICE VISIT (OUTPATIENT)
Age: 80
End: 2025-08-11
Payer: MEDICARE

## 2025-08-11 VITALS
OXYGEN SATURATION: 97 % | DIASTOLIC BLOOD PRESSURE: 80 MMHG | SYSTOLIC BLOOD PRESSURE: 123 MMHG | WEIGHT: 162 LBS | BODY MASS INDEX: 26.99 KG/M2 | HEART RATE: 68 BPM | RESPIRATION RATE: 16 BRPM | HEIGHT: 65 IN | TEMPERATURE: 98.1 F

## 2025-08-11 DIAGNOSIS — G47.9 SLEEP DIFFICULTIES: ICD-10-CM

## 2025-08-11 DIAGNOSIS — R45.4 FEELING IRRITABLE: ICD-10-CM

## 2025-08-11 DIAGNOSIS — F03.90 DEMENTIA WITHOUT BEHAVIORAL DISTURBANCE, PSYCHOTIC DISTURBANCE, MOOD DISTURBANCE, OR ANXIETY, UNSPECIFIED DEMENTIA SEVERITY, UNSPECIFIED DEMENTIA TYPE (HCC): Primary | ICD-10-CM

## 2025-08-11 PROCEDURE — 1090F PRES/ABSN URINE INCON ASSESS: CPT | Performed by: INTERNAL MEDICINE

## 2025-08-11 PROCEDURE — G8399 PT W/DXA RESULTS DOCUMENT: HCPCS | Performed by: INTERNAL MEDICINE

## 2025-08-11 PROCEDURE — 1160F RVW MEDS BY RX/DR IN RCRD: CPT | Performed by: INTERNAL MEDICINE

## 2025-08-11 PROCEDURE — G8417 CALC BMI ABV UP PARAM F/U: HCPCS | Performed by: INTERNAL MEDICINE

## 2025-08-11 PROCEDURE — 99214 OFFICE O/P EST MOD 30 MIN: CPT | Performed by: INTERNAL MEDICINE

## 2025-08-11 PROCEDURE — 1159F MED LIST DOCD IN RCRD: CPT | Performed by: INTERNAL MEDICINE

## 2025-08-11 PROCEDURE — 1126F AMNT PAIN NOTED NONE PRSNT: CPT | Performed by: INTERNAL MEDICINE

## 2025-08-11 PROCEDURE — G8427 DOCREV CUR MEDS BY ELIG CLIN: HCPCS | Performed by: INTERNAL MEDICINE

## 2025-08-11 PROCEDURE — 1036F TOBACCO NON-USER: CPT | Performed by: INTERNAL MEDICINE

## 2025-08-11 PROCEDURE — 1123F ACP DISCUSS/DSCN MKR DOCD: CPT | Performed by: INTERNAL MEDICINE

## 2025-08-11 RX ORDER — ATORVASTATIN CALCIUM 20 MG/1
20 TABLET, FILM COATED ORAL DAILY
COMMUNITY
Start: 2025-08-11 | End: 2025-08-12

## 2025-08-11 RX ORDER — SERTRALINE HYDROCHLORIDE 25 MG/1
25 TABLET, FILM COATED ORAL DAILY
Qty: 30 TABLET | Refills: 0 | Status: SHIPPED | OUTPATIENT
Start: 2025-08-11

## 2025-08-12 DIAGNOSIS — E78.2 MIXED HYPERLIPIDEMIA: ICD-10-CM

## 2025-08-12 DIAGNOSIS — F03.90 DEMENTIA WITHOUT BEHAVIORAL DISTURBANCE, PSYCHOTIC DISTURBANCE, MOOD DISTURBANCE, OR ANXIETY, UNSPECIFIED DEMENTIA SEVERITY, UNSPECIFIED DEMENTIA TYPE (HCC): Primary | ICD-10-CM

## 2025-08-12 RX ORDER — ATORVASTATIN CALCIUM 20 MG/1
20 TABLET, FILM COATED ORAL DAILY
Qty: 90 TABLET | Refills: 0 | Status: SHIPPED | OUTPATIENT
Start: 2025-08-12

## 2025-09-03 DIAGNOSIS — F03.A0 MILD DEMENTIA, UNSPECIFIED DEMENTIA TYPE, UNSPECIFIED WHETHER BEHAVIORAL, PSYCHOTIC, OR MOOD DISTURBANCE OR ANXIETY (HCC): Primary | ICD-10-CM

## 2025-09-03 RX ORDER — SERTRALINE HYDROCHLORIDE 25 MG/1
25 TABLET, FILM COATED ORAL DAILY
Qty: 30 TABLET | Refills: 2 | Status: SHIPPED | OUTPATIENT
Start: 2025-09-03

## 2025-09-04 RX ORDER — MEMANTINE HYDROCHLORIDE 5 MG/1
5 TABLET ORAL 2 TIMES DAILY
Qty: 60 TABLET | Refills: 0 | Status: SHIPPED | OUTPATIENT
Start: 2025-09-04

## (undated) DEVICE — SAFEGUIDE GUIDEWIRE

## (undated) DEVICE — 1200 GUARD II KIT W/5MM TUBE W/O VAC TUBE: Brand: GUARDIAN

## (undated) DEVICE — Z DISCONTINUED NO SUB IDED SET EXTN W/ 4 W STPCOCK M SPIN LOK 36IN

## (undated) DEVICE — CONNECTOR TBNG AUX H2O JET DISP FOR OLY 160/180 SER

## (undated) DEVICE — SOLIDIFIER FLUID 3000 CC ABSORB

## (undated) DEVICE — BAG BELONG PT PERS CLEAR HANDL

## (undated) DEVICE — SET EXTN TBNG L BOR 4 W STPCOCK ST 32IN PRIMING VOL 6ML

## (undated) DEVICE — SYR 50ML SLIP TIP NSAF LF STRL --

## (undated) DEVICE — ESOPHAGEAL/PYLORIC/COLONIC/BILIARY WIREGUIDED BALLOON DILATATION CATHETER: Brand: CRE™ PRO

## (undated) DEVICE — SYRINGE MED 20ML STD CLR PLAS LUERLOCK TIP N CTRL DISP

## (undated) DEVICE — TUBING HYDR IRR --

## (undated) DEVICE — CANN NASAL O2 CAPNOGRAPHY AD -- FILTERLINE

## (undated) DEVICE — FORCEPS BX L240CM JAW DIA2.8MM L CAP W/ NDL MIC MESH TOOTH

## (undated) DEVICE — Device

## (undated) DEVICE — DILATOR ESOPH 48 FRX16 MM OVR THE GUIDEWIRE FLX SAFEGUIDE

## (undated) DEVICE — KENDALL RADIOLUCENT FOAM MONITORING ELECTRODE -RECTANGULAR SHAPE: Brand: KENDALL

## (undated) DEVICE — ENDO CARRY-ON PROCEDURE KIT INCLUDES ENZYMATIC SPONGE, GAUZE, BIOHAZARD LABEL, TRAY, LUBRICANT, DIRTY SCOPE LABEL, WATER LABEL, TRAY, DRAWSTRING PAD, AND DEFENDO 4-PIECE KIT.: Brand: ENDO CARRY-ON PROCEDURE KIT

## (undated) DEVICE — CATH IV AUTOGRD BC BLU 22GA 25 -- INSYTE

## (undated) DEVICE — SNARE ENDOSCP M L240CM W27MM SHTH DIA2.4MM CHN 2.8MM OVL

## (undated) DEVICE — BAG SPEC BIOHZD LF 2MIL 6X10IN -- CONVERT TO ITEM 357326

## (undated) DEVICE — KIT IV STRT W CHLORAPREP PD 1ML

## (undated) DEVICE — WRISTBAND ID AD W1XL11.5IN RED POLY ALRG PREPRINTED PERM

## (undated) DEVICE — Z DISCONTINUED USE 2751540 TUBING IRRIG L10IN DISP PMP ENDOGATOR

## (undated) DEVICE — REM POLYHESIVE ADULT PATIENT RETURN ELECTRODE: Brand: VALLEYLAB

## (undated) DEVICE — SET ADMIN 16ML TBNG L100IN 2 Y INJ SITE IV PIGGY BK DISP

## (undated) DEVICE — NEEDLE HYPO 18GA L1.5IN PNK S STL HUB POLYPR SHLD REG BVL

## (undated) DEVICE — BW-412T DISP COMBO CLEANING BRUSH: Brand: SINGLE USE COMBINATION CLEANING BRUSH

## (undated) DEVICE — AIRLIFE™ U/CONNECT-IT OXYGEN TUBING 7 FEET (2.1 M) CRUSH-RESISTANT OXYGEN TUBING, VINYL TIPPED: Brand: AIRLIFE™

## (undated) DEVICE — NEONATAL-ADULT SPO2 SENSOR: Brand: NELLCOR

## (undated) DEVICE — TRAP SURG QUAD PARABOLA SLOT DSGN SFTY SCRN TRAPEASE

## (undated) DEVICE — SYR ASSEMB INFL BLLN 60ML --

## (undated) DEVICE — CONTAINER SPEC 20 ML LID NEUT BUFF FORMALIN 10 % POLYPR STS

## (undated) DEVICE — QUILTED PREMIUM COMFORT UNDERPAD,EXTRA HEAVY: Brand: WINGS